# Patient Record
Sex: MALE | Race: WHITE | NOT HISPANIC OR LATINO | Employment: FULL TIME | URBAN - METROPOLITAN AREA
[De-identification: names, ages, dates, MRNs, and addresses within clinical notes are randomized per-mention and may not be internally consistent; named-entity substitution may affect disease eponyms.]

---

## 2023-02-01 PROBLEM — D84.9 IMMUNOSUPPRESSION (MULTI): Status: ACTIVE | Noted: 2023-02-01

## 2023-02-01 PROBLEM — K59.03 DRUG-INDUCED CONSTIPATION: Status: ACTIVE | Noted: 2023-02-01

## 2023-02-01 PROBLEM — R79.89 ELEVATED SERUM CREATININE: Status: ACTIVE | Noted: 2023-02-01

## 2023-02-01 PROBLEM — H66.91 RIGHT OTITIS MEDIA: Status: ACTIVE | Noted: 2023-02-01

## 2023-02-01 PROBLEM — R56.9 SEIZURE (MULTI): Status: ACTIVE | Noted: 2023-02-01

## 2023-02-01 PROBLEM — J01.90 ACUTE SINUSITIS: Status: ACTIVE | Noted: 2023-02-01

## 2023-02-01 PROBLEM — T50.995A ADVERSE EFFECT OF OTHER DRUGS, MEDICAMENTS AND BIOLOGICAL SUBSTANCES, INITIAL ENCOUNTER: Status: ACTIVE | Noted: 2023-02-01

## 2023-02-01 PROBLEM — J18.9 PNEUMONIA: Status: ACTIVE | Noted: 2023-02-01

## 2023-02-01 PROBLEM — R56.9 SINGLE SEIZURE (MULTI): Status: ACTIVE | Noted: 2023-02-01

## 2023-02-01 PROBLEM — Z94.0 KIDNEY REPLACED BY TRANSPLANT (HHS-HCC): Status: ACTIVE | Noted: 2023-02-01

## 2023-02-01 PROBLEM — R04.2 HEMOPTYSIS: Status: ACTIVE | Noted: 2023-02-01

## 2023-02-01 PROBLEM — H10.9 CONJUNCTIVITIS, LEFT EYE: Status: ACTIVE | Noted: 2023-02-01

## 2023-02-01 PROBLEM — B18.2: Status: ACTIVE | Noted: 2023-02-01

## 2023-02-01 PROBLEM — T86.11 KIDNEY TRANSPLANT REJECTION (HHS-HCC): Status: ACTIVE | Noted: 2023-02-01

## 2023-02-01 PROBLEM — J31.0 RHINITIS: Status: ACTIVE | Noted: 2023-02-01

## 2023-02-01 PROBLEM — R19.7 DIARRHEA: Status: ACTIVE | Noted: 2023-02-01

## 2023-02-01 PROBLEM — H66.92 LEFT OTITIS MEDIA: Status: ACTIVE | Noted: 2023-02-01

## 2023-02-01 PROBLEM — T86.11: Status: ACTIVE | Noted: 2023-02-01

## 2023-02-01 PROBLEM — U07.1 COVID-19 VIRUS INFECTION: Status: ACTIVE | Noted: 2023-02-01

## 2023-02-01 PROBLEM — J93.9 PNEUMOTHORAX: Status: ACTIVE | Noted: 2023-02-01

## 2023-02-01 PROBLEM — G89.18 POST-OP PAIN: Status: ACTIVE | Noted: 2023-02-01

## 2023-02-01 PROBLEM — I10 ESSENTIAL HYPERTENSION: Status: ACTIVE | Noted: 2023-02-01

## 2023-02-01 PROBLEM — I67.83 PRES (POSTERIOR REVERSIBLE ENCEPHALOPATHY SYNDROME): Status: ACTIVE | Noted: 2023-02-01

## 2023-03-14 RX ORDER — INDOMETHACIN 50 MG/1
50 CAPSULE ORAL
COMMUNITY
End: 2023-10-10 | Stop reason: ALTCHOICE

## 2023-03-14 RX ORDER — CYCLOSPORINE 100 MG/1
100 CAPSULE, LIQUID FILLED ORAL EVERY 12 HOURS
COMMUNITY
Start: 2020-01-31 | End: 2023-10-10 | Stop reason: SDUPTHER

## 2023-03-14 RX ORDER — CLONIDINE HYDROCHLORIDE 0.1 MG/1
1 TABLET ORAL DAILY
COMMUNITY
Start: 2020-02-03 | End: 2023-10-10 | Stop reason: SDUPTHER

## 2023-03-14 RX ORDER — TALC
3 POWDER (GRAM) TOPICAL NIGHTLY PRN
COMMUNITY
Start: 2020-01-31

## 2023-03-14 RX ORDER — PREDNISONE 5 MG/1
1 TABLET ORAL DAILY
COMMUNITY
Start: 2020-01-31 | End: 2023-10-10 | Stop reason: SDUPTHER

## 2023-03-14 RX ORDER — METOPROLOL TARTRATE 100 MG/1
100 TABLET ORAL EVERY 12 HOURS
COMMUNITY
Start: 2018-10-05 | End: 2023-10-10 | Stop reason: SDUPTHER

## 2023-03-14 RX ORDER — NIFEDIPINE 30 MG/1
1 TABLET, FILM COATED, EXTENDED RELEASE ORAL 2 TIMES DAILY
COMMUNITY
Start: 2018-09-11 | End: 2023-10-10 | Stop reason: SDUPTHER

## 2023-03-14 RX ORDER — CYCLOSPORINE 25 MG/1
25 CAPSULE, LIQUID FILLED ORAL 2 TIMES DAILY
COMMUNITY
Start: 2020-01-31 | End: 2023-10-10 | Stop reason: SDUPTHER

## 2023-03-14 RX ORDER — MYCOPHENOLATE MOFETIL 250 MG/1
750 CAPSULE ORAL EVERY 12 HOURS
COMMUNITY
Start: 2020-01-31 | End: 2023-10-10 | Stop reason: SDUPTHER

## 2023-03-15 ENCOUNTER — OFFICE VISIT (OUTPATIENT)
Dept: PRIMARY CARE | Facility: CLINIC | Age: 38
End: 2023-03-15
Payer: COMMERCIAL

## 2023-03-15 VITALS
BODY MASS INDEX: 26.18 KG/M2 | TEMPERATURE: 97 F | HEART RATE: 50 BPM | SYSTOLIC BLOOD PRESSURE: 111 MMHG | WEIGHT: 172.7 LBS | HEIGHT: 68 IN | DIASTOLIC BLOOD PRESSURE: 74 MMHG | OXYGEN SATURATION: 96 %

## 2023-03-15 DIAGNOSIS — N18.6 END STAGE RENAL DISEASE (MULTI): ICD-10-CM

## 2023-03-15 DIAGNOSIS — D84.9 IMMUNOSUPPRESSION (MULTI): ICD-10-CM

## 2023-03-15 DIAGNOSIS — B18.2: ICD-10-CM

## 2023-03-15 DIAGNOSIS — M10.00 ACUTE IDIOPATHIC GOUT, UNSPECIFIED SITE: Primary | ICD-10-CM

## 2023-03-15 DIAGNOSIS — R56.9: ICD-10-CM

## 2023-03-15 PROCEDURE — 1036F TOBACCO NON-USER: CPT | Performed by: FAMILY MEDICINE

## 2023-03-15 PROCEDURE — 3078F DIAST BP <80 MM HG: CPT | Performed by: FAMILY MEDICINE

## 2023-03-15 PROCEDURE — 3074F SYST BP LT 130 MM HG: CPT | Performed by: FAMILY MEDICINE

## 2023-03-15 PROCEDURE — 99213 OFFICE O/P EST LOW 20 MIN: CPT | Performed by: FAMILY MEDICINE

## 2023-03-15 ASSESSMENT — PAIN SCALES - GENERAL: PAINLEVEL: 0-NO PAIN

## 2023-03-15 NOTE — PROGRESS NOTES
"Subjective   Patient ID: Luigi Sheppard is a 38 y.o. male who presents for Follow-up and Gout.    Patient feels well. No further gouty attacks. Actually didn't use indomethacin I prescribed in January as he didn't need it. HE just started a new job at [employer]. A number of care gaps listed but these were almost certainly addressed since he had his kidney transplant in 2020. 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        Review of Systems    Objective   /74 (BP Location: Right arm, Patient Position: Sitting, BP Cuff Size: Adult)   Pulse 50   Temp 36.1 °C (97 °F) (Temporal)   Ht 1.727 m (5' 8\")   Wt 78.3 kg (172 lb 11.2 oz)   SpO2 96%   BMI 26.26 kg/m²     Physical Exam  Constitutional:       Appearance: Normal appearance. He is normal weight.   Cardiovascular:      Rate and Rhythm: Normal rate and regular rhythm.   Pulmonary:      Effort: Pulmonary effort is normal.      Breath sounds: No wheezing, rhonchi or rales.   Neurological:      Mental Status: He is alert.     Assessment/Plan   Diagnoses and all orders for this visit:  Acute idiopathic gout, unspecified site         "

## 2023-04-11 ENCOUNTER — APPOINTMENT (OUTPATIENT)
Dept: LAB | Facility: LAB | Age: 38
End: 2023-04-11
Payer: COMMERCIAL

## 2023-04-11 LAB
ALBUMIN (G/DL) IN SER/PLAS: 4.7 G/DL (ref 3.4–5)
ANION GAP IN SER/PLAS: 13 MMOL/L (ref 10–20)
CALCIUM (MG/DL) IN SER/PLAS: 9.9 MG/DL (ref 8.6–10.6)
CARBON DIOXIDE, TOTAL (MMOL/L) IN SER/PLAS: 25 MMOL/L (ref 21–32)
CHLORIDE (MMOL/L) IN SER/PLAS: 103 MMOL/L (ref 98–107)
CREATININE (MG/DL) IN SER/PLAS: 1.06 MG/DL (ref 0.5–1.3)
ERYTHROCYTE DISTRIBUTION WIDTH (RATIO) BY AUTOMATED COUNT: 14 % (ref 11.5–14.5)
ERYTHROCYTE MEAN CORPUSCULAR HEMOGLOBIN CONCENTRATION (G/DL) BY AUTOMATED: 32.7 G/DL (ref 32–36)
ERYTHROCYTE MEAN CORPUSCULAR VOLUME (FL) BY AUTOMATED COUNT: 86 FL (ref 80–100)
ERYTHROCYTES (10*6/UL) IN BLOOD BY AUTOMATED COUNT: 5.31 X10E12/L (ref 4.5–5.9)
GFR MALE: >90 ML/MIN/1.73M2
GLUCOSE (MG/DL) IN SER/PLAS: 96 MG/DL (ref 74–99)
HEMATOCRIT (%) IN BLOOD BY AUTOMATED COUNT: 45.5 % (ref 41–52)
HEMOGLOBIN (G/DL) IN BLOOD: 14.9 G/DL (ref 13.5–17.5)
LEUKOCYTES (10*3/UL) IN BLOOD BY AUTOMATED COUNT: 7.8 X10E9/L (ref 4.4–11.3)
NRBC (PER 100 WBCS) BY AUTOMATED COUNT: 0 /100 WBC (ref 0–0)
PHOSPHATE (MG/DL) IN SER/PLAS: 3.4 MG/DL (ref 2.5–4.9)
PLATELETS (10*3/UL) IN BLOOD AUTOMATED COUNT: 249 X10E9/L (ref 150–450)
POTASSIUM (MMOL/L) IN SER/PLAS: 4.3 MMOL/L (ref 3.5–5.3)
SODIUM (MMOL/L) IN SER/PLAS: 137 MMOL/L (ref 136–145)
UREA NITROGEN (MG/DL) IN SER/PLAS: 22 MG/DL (ref 6–23)

## 2023-04-12 LAB — CYCLOSPORINE (NG/L) IN BLOOD: 168 NG/ML (ref 80–210)

## 2023-09-21 LAB
ALBUMIN (G/DL) IN SER/PLAS: 4.6 G/DL (ref 3.4–5)
ANION GAP IN SER/PLAS: 9 MMOL/L (ref 10–20)
CALCIUM (MG/DL) IN SER/PLAS: 9.8 MG/DL (ref 8.6–10.3)
CARBON DIOXIDE, TOTAL (MMOL/L) IN SER/PLAS: 30 MMOL/L (ref 21–32)
CHLORIDE (MMOL/L) IN SER/PLAS: 103 MMOL/L (ref 98–107)
CREATININE (MG/DL) IN SER/PLAS: 1.08 MG/DL (ref 0.5–1.3)
ERYTHROCYTE DISTRIBUTION WIDTH (RATIO) BY AUTOMATED COUNT: 13.8 % (ref 11.5–14.5)
ERYTHROCYTE MEAN CORPUSCULAR HEMOGLOBIN CONCENTRATION (G/DL) BY AUTOMATED: 32.5 G/DL (ref 32–36)
ERYTHROCYTE MEAN CORPUSCULAR VOLUME (FL) BY AUTOMATED COUNT: 89 FL (ref 80–100)
ERYTHROCYTES (10*6/UL) IN BLOOD BY AUTOMATED COUNT: 5.24 X10E12/L (ref 4.5–5.9)
GFR MALE: 89 ML/MIN/1.73M2
GLUCOSE (MG/DL) IN SER/PLAS: 82 MG/DL (ref 74–99)
HEMATOCRIT (%) IN BLOOD BY AUTOMATED COUNT: 46.5 % (ref 41–52)
HEMOGLOBIN (G/DL) IN BLOOD: 15.1 G/DL (ref 13.5–17.5)
LEUKOCYTES (10*3/UL) IN BLOOD BY AUTOMATED COUNT: 5.7 X10E9/L (ref 4.4–11.3)
PHOSPHATE (MG/DL) IN SER/PLAS: 3 MG/DL (ref 2.5–4.9)
PLATELETS (10*3/UL) IN BLOOD AUTOMATED COUNT: 255 X10E9/L (ref 150–450)
POTASSIUM (MMOL/L) IN SER/PLAS: 4.3 MMOL/L (ref 3.5–5.3)
SODIUM (MMOL/L) IN SER/PLAS: 138 MMOL/L (ref 136–145)
URATE (MG/DL) IN SER/PLAS: 6.3 MG/DL (ref 4–7.5)
UREA NITROGEN (MG/DL) IN SER/PLAS: 14 MG/DL (ref 6–23)

## 2023-09-22 LAB — CYCLOSPORINE (NG/L) IN BLOOD: 92 NG/ML (ref 80–210)

## 2023-10-06 ENCOUNTER — SPECIALTY PHARMACY (OUTPATIENT)
Dept: PHARMACY | Facility: CLINIC | Age: 38
End: 2023-10-06

## 2023-10-06 ENCOUNTER — PHARMACY VISIT (OUTPATIENT)
Dept: PHARMACY | Facility: CLINIC | Age: 38
End: 2023-10-06
Payer: COMMERCIAL

## 2023-10-06 PROCEDURE — RXMED WILLOW AMBULATORY MEDICATION CHARGE

## 2023-10-06 NOTE — PROGRESS NOTES
Specialty Pharmacy Refill Coordination Note     Luigi Sheppard is a 38 y.o. male contacted today regarding refills of his specialty medication(s).    Patient address: [address] [place] [address]    Patient Medications: Cyclosporine 25mg/100mg, Clonidine 0.1mg, Metoprolol Tartrate 100mg, Mycophenolate 250mg, Nifedipine 30mg, Prednisone 5mg     Medication delivery date: Wed 10/11/2023         Laura Jacob  Specialty Pharmacy Technician

## 2023-10-10 RX ORDER — ALLOPURINOL 100 MG/1
50 TABLET ORAL DAILY
COMMUNITY
End: 2024-04-11 | Stop reason: SDUPTHER

## 2023-10-11 ENCOUNTER — APPOINTMENT (OUTPATIENT)
Dept: TRANSPLANT | Facility: HOSPITAL | Age: 38
End: 2023-10-11
Payer: COMMERCIAL

## 2023-10-11 ENCOUNTER — OFFICE VISIT (OUTPATIENT)
Dept: TRANSPLANT | Facility: HOSPITAL | Age: 38
End: 2023-10-11
Payer: COMMERCIAL

## 2023-10-11 VITALS
DIASTOLIC BLOOD PRESSURE: 73 MMHG | TEMPERATURE: 98.1 F | SYSTOLIC BLOOD PRESSURE: 137 MMHG | BODY MASS INDEX: 26.27 KG/M2 | HEART RATE: 52 BPM | HEIGHT: 68 IN | WEIGHT: 173.3 LBS | RESPIRATION RATE: 16 BRPM | OXYGEN SATURATION: 98 %

## 2023-10-11 DIAGNOSIS — I10 HYPERTENSION, UNSPECIFIED TYPE: ICD-10-CM

## 2023-10-11 DIAGNOSIS — Z94.0 KIDNEY REPLACED BY TRANSPLANT (HHS-HCC): ICD-10-CM

## 2023-10-11 DIAGNOSIS — Z92.25 PERSONAL HISTORY OF IMMUNOSUPRESSION THERAPY: Primary | ICD-10-CM

## 2023-10-11 PROCEDURE — 3075F SYST BP GE 130 - 139MM HG: CPT | Performed by: HOSPITALIST

## 2023-10-11 PROCEDURE — 99214 OFFICE O/P EST MOD 30 MIN: CPT

## 2023-10-11 PROCEDURE — 3078F DIAST BP <80 MM HG: CPT | Performed by: HOSPITALIST

## 2023-10-11 PROCEDURE — 1036F TOBACCO NON-USER: CPT | Performed by: HOSPITALIST

## 2023-10-11 RX ORDER — CYCLOSPORINE 100 MG/1
100 CAPSULE, LIQUID FILLED ORAL EVERY 12 HOURS
Qty: 180 CAPSULE | Refills: 0 | Status: SHIPPED | OUTPATIENT
Start: 2023-10-11 | End: 2024-04-11 | Stop reason: SDUPTHER

## 2023-10-11 RX ORDER — CYCLOSPORINE 25 MG/1
CAPSULE, LIQUID FILLED ORAL
Qty: 180 CAPSULE | Refills: 3 | Status: SHIPPED | OUTPATIENT
Start: 2023-10-11 | End: 2024-04-11 | Stop reason: SDUPTHER

## 2023-10-11 RX ORDER — METOPROLOL TARTRATE 100 MG/1
TABLET ORAL
Qty: 180 TABLET | Refills: 3 | Status: SHIPPED | OUTPATIENT
Start: 2023-10-11 | End: 2024-04-11 | Stop reason: SDUPTHER

## 2023-10-11 RX ORDER — NIFEDIPINE 30 MG/1
30 TABLET, FILM COATED, EXTENDED RELEASE ORAL 2 TIMES DAILY
Qty: 180 TABLET | Refills: 3 | Status: SHIPPED | OUTPATIENT
Start: 2023-10-11 | End: 2024-04-11 | Stop reason: SDUPTHER

## 2023-10-11 RX ORDER — PREDNISONE 5 MG/1
5 TABLET ORAL DAILY
Qty: 90 TABLET | Refills: 3 | Status: SHIPPED | OUTPATIENT
Start: 2023-10-11 | End: 2024-04-11 | Stop reason: SDUPTHER

## 2023-10-11 ASSESSMENT — ENCOUNTER SYMPTOMS
PALPITATIONS: 0
VOMITING: 0
ABDOMINAL DISTENTION: 0
HEADACHES: 0
COUGH: 0
HEMATURIA: 0
FREQUENCY: 0
SHORTNESS OF BREATH: 0
DIARRHEA: 0
CHEST TIGHTNESS: 0
CONFUSION: 0
BACK PAIN: 0
NAUSEA: 0
NERVOUS/ANXIOUS: 0

## 2023-10-11 NOTE — PROGRESS NOTES
"Subjective:Luigi Sheppard 38 y.o.  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 His previous transplant course was complicated by biopsy proven antibody-mediated rejection and borderline acute cellular rejection. He received another  donor kidney transplant on 2020. The donor was increased risk.Hep C seroconversion needing treatment.. Was admitted for an isolated seizure due to PRES in 2019.Switched from tacrolimus to Cyclosporine due to risk association between Tacrolimus and PRES.     Review of Systems   Respiratory:  Negative for cough, chest tightness and shortness of breath.    Cardiovascular:  Negative for chest pain, palpitations and leg swelling.   Gastrointestinal:  Negative for abdominal distention, diarrhea, nausea and vomiting.   Genitourinary:  Negative for frequency, hematuria and urgency.   Musculoskeletal:  Negative for back pain.   Neurological:  Negative for headaches.   Psychiatric/Behavioral:  Negative for confusion. The patient is not nervous/anxious.         Objective:  Visit Vitals  /73 (BP Location: Right arm, Patient Position: Sitting, BP Cuff Size: Adult)   Pulse 52   Temp 36.7 °C (98.1 °F)   Resp 16   Ht 1.727 m (5' 8\")   Wt 78.6 kg (173 lb 4.8 oz)   SpO2 98%   BMI 26.35 kg/m²   Smoking Status Never   BSA 1.94 m²      Physical Exam  HENT:      Head: Normocephalic and atraumatic.      Nose: Nose normal.      Mouth/Throat:      Mouth: Mucous membranes are moist.   Eyes:      Extraocular Movements: Extraocular movements intact.      Pupils: Pupils are equal, round, and reactive to light.   Cardiovascular:      Rate and Rhythm: Normal rate.      Pulses: Normal pulses.      Heart sounds: Normal heart sounds.   Pulmonary:      Effort: Pulmonary effort is normal.   Abdominal:      Palpations: Abdomen is soft.      Tenderness: There is no abdominal tenderness. There is no guarding. "   Musculoskeletal:         General: Normal range of motion.      Cervical back: Normal range of motion.   Skin:     General: Skin is warm.   Neurological:      General: No focal deficit present.      Mental Status: Mental status is at baseline.   Psychiatric:         Mood and Affect: Mood normal.            Current Outpatient Medications:     allopurinol (Zyloprim) 100 mg tablet, Take 0.5 tablets (50 mg) by mouth once daily., Disp: , Rfl:     cloNIDine (Catapres) 0.1 mg tablet, TAKE ONE (1) TABLET BY MOUTH DAILY., Disp: 90 tablet, Rfl: 3    cycloSPORINE modified (NEOral) 100 mg capsule, Take 1 capsule (100 mg) by mouth every 12 hours., Disp: 180 capsule, Rfl: 0    cycloSPORINE modified (NEOral) 25 mg capsule, TAKE ONE (1) CAPSULE BY MOUTH TWICE DAILY ALONG WITH ONE (1) 100 MG CAPSULE FOR A TOTAL DOSE  MG TWICE DAILY., Disp: 180 capsule, Rfl: 3    melatonin 3 mg tablet, Take 1 tablet (3 mg) by mouth as needed at bedtime for sleep., Disp: , Rfl:     metoprolol tartrate (Lopressor) 100 mg tablet, TAKE ONE (1) TABLET BY MOUTH EVERY 12 HOURS. HOLD DOSE IF SYSTOLIC BLOOD PRESSURE<110 OR HEART RATE<60, Disp: 180 tablet, Rfl: 3    mycophenolate (Cellcept) 250 mg capsule, TAKE THREE (3) CAPSULES BY MOUTH EVERY 12 HOURS., Disp: 540 capsule, Rfl: 3    NIFEdipine ER (Adalat CC) 30 mg 24 hr tablet, TAKE ONE (1) TABLET BY MOUTH TWICE DAILY., Disp: 180 tablet, Rfl: 3    predniSONE (Deltasone) 5 mg tablet, TAKE ONE (1) TABLET BY MOUTH DAILY., Disp: 90 tablet, Rfl: 3      [unfilled]     No images are attached to the encounter.     Assessment and Plan :Luigi Sheppard 38 y.o.  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 His previous transplant course was complicated by biopsy proven antibody-mediated rejection and borderline acute cellular rejection. He received another  donor kidney transplant on 2020. The donor was  increased risk.Hep C seroconversion needing treatment.. Was admitted for an isolated seizure due to PRES in 12/2019.Switched from tacrolimus to Cyclosporine due to risk association between Tacrolimus and PRES.     Interim history: Denied any complaints on today's visit.  Last attack of gout is almost 6 months back since we started allopurinol.    Allograft function: Stable creatinine in the range of 0.9-1.1 currently around 1.08, stable electrolytes last UPC 0.06 we will repeat UPC with the next labs.    Immunosuppression: Continue with the current dose of prednisone, mycophenolate 3 capsules twice daily and cyclosporine 125 twice daily.  Recent cyclosporine levels are 92 Aim levels are 75-1 50.    Hemodynamics: Blood pressures are optimally controlled continue with the clonidine and metoprolol and nifedipine.    No anemia or leukopenia noticed on recent labs.    Bone mineral disease: Recommended DEXA scan, calcium and phosphorus levels are optimal we will check vitamin D and PTH with the next labs.    General health care: Recommended annual dermatology visits and age-appropriate cancer screening.  Also recommended COVID and flu shots.    Labs every 3 months and follow-up in 6 months.      Av Macias MD

## 2023-11-25 DIAGNOSIS — D84.9 COVID-19 IN IMMUNOCOMPROMISED PATIENT (MULTI): ICD-10-CM

## 2023-11-25 DIAGNOSIS — U07.1 COVID-19 IN IMMUNOCOMPROMISED PATIENT (MULTI): ICD-10-CM

## 2023-11-25 NOTE — TELEPHONE ENCOUNTER
"ON-CALL:  Patient paged on-call to report he had tested positive for COVID. He reports mild symptoms that \"feel like a cold\" starting Thursday. Patient instructed to decrease MMF to 500mg bid x1 week and provided ED precautions. Rx for molnupiravir sent to Dr. Hebert for signature to go to [facility] in [place], [place] where patient is visiting. Luigi expressed understanding of all.   "

## 2023-11-29 DIAGNOSIS — Z94.0 KIDNEY REPLACED BY TRANSPLANT (HHS-HCC): Primary | ICD-10-CM

## 2023-11-29 RX ORDER — MYCOPHENOLATE MOFETIL 250 MG/1
750 CAPSULE ORAL EVERY 12 HOURS
Qty: 540 CAPSULE | Refills: 3 | Status: CANCELLED | OUTPATIENT
Start: 2023-11-29 | End: 2024-11-27

## 2023-11-29 RX ORDER — CLONIDINE HYDROCHLORIDE 0.1 MG/1
0.1 TABLET ORAL DAILY
Qty: 90 TABLET | Refills: 3 | Status: SHIPPED | OUTPATIENT
Start: 2023-11-29 | End: 2024-04-11 | Stop reason: SDUPTHER

## 2023-12-01 ENCOUNTER — SPECIALTY PHARMACY (OUTPATIENT)
Dept: PHARMACY | Facility: CLINIC | Age: 38
End: 2023-12-01

## 2023-12-20 ENCOUNTER — OFFICE VISIT (OUTPATIENT)
Dept: PRIMARY CARE | Facility: CLINIC | Age: 38
End: 2023-12-20
Payer: COMMERCIAL

## 2023-12-20 VITALS
TEMPERATURE: 97.8 F | OXYGEN SATURATION: 99 % | WEIGHT: 173.2 LBS | DIASTOLIC BLOOD PRESSURE: 86 MMHG | SYSTOLIC BLOOD PRESSURE: 138 MMHG | HEIGHT: 68 IN | BODY MASS INDEX: 26.25 KG/M2 | HEART RATE: 50 BPM

## 2023-12-20 DIAGNOSIS — L30.9 ECZEMA OF FACE: Primary | ICD-10-CM

## 2023-12-20 DIAGNOSIS — Z00.00 WELL ADULT EXAM: ICD-10-CM

## 2023-12-20 PROCEDURE — 3075F SYST BP GE 130 - 139MM HG: CPT | Performed by: FAMILY MEDICINE

## 2023-12-20 PROCEDURE — 99396 PREV VISIT EST AGE 40-64: CPT | Performed by: FAMILY MEDICINE

## 2023-12-20 PROCEDURE — 1036F TOBACCO NON-USER: CPT | Performed by: FAMILY MEDICINE

## 2023-12-20 PROCEDURE — 3079F DIAST BP 80-89 MM HG: CPT | Performed by: FAMILY MEDICINE

## 2023-12-20 RX ORDER — MOMETASONE FUROATE 1 MG/G
OINTMENT TOPICAL DAILY
Qty: 45 G | Refills: 0 | Status: SHIPPED | OUTPATIENT
Start: 2023-12-20 | End: 2024-01-19

## 2023-12-20 ASSESSMENT — ENCOUNTER SYMPTOMS
OCCASIONAL FEELINGS OF UNSTEADINESS: 0
DEPRESSION: 0
LOSS OF SENSATION IN FEET: 0

## 2023-12-20 ASSESSMENT — PAIN SCALES - GENERAL: PAINLEVEL: 0-NO PAIN

## 2023-12-20 NOTE — PROGRESS NOTES
Luigi has been doing quite well since I last saw him roughly 5 months ago. He has a history of Alport syndrome with a kidney transplant. He works as a  for [employer]. He lives with his wife.    He is a non-smoker. His alcohol consumption is minimal.     He is up to date on his vaccines through follow-up visits for his transplant. His only concern today is an itchy rash on his forehead and cheeks with no rash elsewhere. He states his skin is dry there as well. It appeared gradually overt the past few months. He otherwise feels quite well. No gouty flares since I've seen him. He takes allopurinol regularly.     ON exam, he looks well. His pulmonary and cardiovascular exams are normal.     He has a macular, slightly erythematous patch on his forehead, and some erythema of both upper cheeks. No scaling.     Imp/Plan:  He is here for a general checkup and doing very well. I believe he has mild eczema, for which I prescribed Elocon, until it disappears. F/U 6 months or sooner if rash persists.

## 2023-12-28 ENCOUNTER — SPECIALTY PHARMACY (OUTPATIENT)
Dept: PHARMACY | Facility: CLINIC | Age: 38
End: 2023-12-28

## 2023-12-28 DIAGNOSIS — Z94.0 KIDNEY REPLACED BY TRANSPLANT (HHS-HCC): Primary | ICD-10-CM

## 2023-12-29 RX ORDER — MYCOPHENOLATE MOFETIL 250 MG/1
750 CAPSULE ORAL EVERY 12 HOURS
Qty: 540 CAPSULE | Refills: 3 | Status: SHIPPED | OUTPATIENT
Start: 2023-12-29 | End: 2024-04-11 | Stop reason: SDUPTHER

## 2024-01-05 PROCEDURE — RXMED WILLOW AMBULATORY MEDICATION CHARGE

## 2024-01-10 ENCOUNTER — PHARMACY VISIT (OUTPATIENT)
Dept: PHARMACY | Facility: CLINIC | Age: 39
End: 2024-01-10
Payer: COMMERCIAL

## 2024-01-25 ENCOUNTER — SPECIALTY PHARMACY (OUTPATIENT)
Dept: PHARMACY | Facility: CLINIC | Age: 39
End: 2024-01-25

## 2024-01-25 PROCEDURE — RXMED WILLOW AMBULATORY MEDICATION CHARGE

## 2024-01-26 ENCOUNTER — PHARMACY VISIT (OUTPATIENT)
Dept: PHARMACY | Facility: CLINIC | Age: 39
End: 2024-01-26
Payer: MEDICARE

## 2024-04-03 ENCOUNTER — LAB (OUTPATIENT)
Dept: LAB | Facility: LAB | Age: 39
End: 2024-04-03
Payer: COMMERCIAL

## 2024-04-03 DIAGNOSIS — Z94.0 KIDNEY REPLACED BY TRANSPLANT (HHS-HCC): ICD-10-CM

## 2024-04-03 LAB
ALBUMIN SERPL BCP-MCNC: 4.5 G/DL (ref 3.4–5)
ANION GAP SERPL CALC-SCNC: 11 MMOL/L (ref 10–20)
BUN SERPL-MCNC: 13 MG/DL (ref 6–23)
CALCIUM SERPL-MCNC: 10 MG/DL (ref 8.6–10.3)
CHLORIDE SERPL-SCNC: 105 MMOL/L (ref 98–107)
CO2 SERPL-SCNC: 27 MMOL/L (ref 21–32)
CREAT SERPL-MCNC: 1.03 MG/DL (ref 0.5–1.3)
CREAT UR-MCNC: 162.8 MG/DL (ref 20–370)
EGFRCR SERPLBLD CKD-EPI 2021: >90 ML/MIN/1.73M*2
ERYTHROCYTE [DISTWIDTH] IN BLOOD BY AUTOMATED COUNT: 13.6 % (ref 11.5–14.5)
GLUCOSE SERPL-MCNC: 74 MG/DL (ref 74–99)
HCT VFR BLD AUTO: 45.6 % (ref 41–52)
HGB BLD-MCNC: 14.6 G/DL (ref 13.5–17.5)
MAGNESIUM SERPL-MCNC: 1.75 MG/DL (ref 1.6–2.4)
MCH RBC QN AUTO: 28.6 PG (ref 26–34)
MCHC RBC AUTO-ENTMCNC: 32 G/DL (ref 32–36)
MCV RBC AUTO: 89 FL (ref 80–100)
NRBC BLD-RTO: 0 /100 WBCS (ref 0–0)
PHOSPHATE SERPL-MCNC: 3.1 MG/DL (ref 2.5–4.9)
PLATELET # BLD AUTO: 305 X10*3/UL (ref 150–450)
POTASSIUM SERPL-SCNC: 4.4 MMOL/L (ref 3.5–5.3)
PROT UR-ACNC: 10 MG/DL (ref 5–25)
PROT/CREAT UR: 0.06 MG/MG CREAT (ref 0–0.17)
RBC # BLD AUTO: 5.11 X10*6/UL (ref 4.5–5.9)
SODIUM SERPL-SCNC: 139 MMOL/L (ref 136–145)
WBC # BLD AUTO: 4.8 X10*3/UL (ref 4.4–11.3)

## 2024-04-03 PROCEDURE — 80158 DRUG ASSAY CYCLOSPORINE: CPT

## 2024-04-03 PROCEDURE — 36415 COLL VENOUS BLD VENIPUNCTURE: CPT

## 2024-04-03 PROCEDURE — 83970 ASSAY OF PARATHORMONE: CPT

## 2024-04-03 PROCEDURE — 82652 VIT D 1 25-DIHYDROXY: CPT

## 2024-04-03 PROCEDURE — 82570 ASSAY OF URINE CREATININE: CPT

## 2024-04-03 PROCEDURE — 85027 COMPLETE CBC AUTOMATED: CPT

## 2024-04-03 PROCEDURE — 83735 ASSAY OF MAGNESIUM: CPT

## 2024-04-03 PROCEDURE — 84156 ASSAY OF PROTEIN URINE: CPT

## 2024-04-03 PROCEDURE — 80069 RENAL FUNCTION PANEL: CPT

## 2024-04-04 ENCOUNTER — SPECIALTY PHARMACY (OUTPATIENT)
Dept: PHARMACY | Facility: CLINIC | Age: 39
End: 2024-04-04

## 2024-04-04 LAB
CYCLOSPORINE BLD IA-MCNC: 101 NG/ML (ref 80–210)
PTH-INTACT SERPL-MCNC: 145.4 PG/ML (ref 18.5–88)

## 2024-04-05 LAB — 1,25(OH)2D3 SERPL-MCNC: 45.1 PG/ML (ref 19.9–79.3)

## 2024-04-11 ENCOUNTER — OFFICE VISIT (OUTPATIENT)
Dept: TRANSPLANT | Facility: HOSPITAL | Age: 39
End: 2024-04-11
Payer: COMMERCIAL

## 2024-04-11 VITALS
SYSTOLIC BLOOD PRESSURE: 124 MMHG | TEMPERATURE: 97.1 F | OXYGEN SATURATION: 97 % | DIASTOLIC BLOOD PRESSURE: 72 MMHG | BODY MASS INDEX: 27.25 KG/M2 | HEART RATE: 56 BPM | WEIGHT: 179.2 LBS

## 2024-04-11 DIAGNOSIS — E55.9 VITAMIN D DEFICIENCY: ICD-10-CM

## 2024-04-11 DIAGNOSIS — I10 ESSENTIAL HYPERTENSION: ICD-10-CM

## 2024-04-11 DIAGNOSIS — E21.3 HYPERPARATHYROIDISM (MULTI): ICD-10-CM

## 2024-04-11 DIAGNOSIS — Z94.0 KIDNEY REPLACED BY TRANSPLANT (HHS-HCC): Primary | ICD-10-CM

## 2024-04-11 DIAGNOSIS — R06.09 DOE (DYSPNEA ON EXERTION): ICD-10-CM

## 2024-04-11 DIAGNOSIS — Z94.0 IMMUNOSUPPRESSIVE MANAGEMENT ENCOUNTER FOLLOWING KIDNEY TRANSPLANT (HHS-HCC): ICD-10-CM

## 2024-04-11 DIAGNOSIS — Z79.899 IMMUNOSUPPRESSIVE MANAGEMENT ENCOUNTER FOLLOWING KIDNEY TRANSPLANT (HHS-HCC): ICD-10-CM

## 2024-04-11 PROCEDURE — 99214 OFFICE O/P EST MOD 30 MIN: CPT

## 2024-04-11 PROCEDURE — 3074F SYST BP LT 130 MM HG: CPT

## 2024-04-11 PROCEDURE — 3078F DIAST BP <80 MM HG: CPT

## 2024-04-11 PROCEDURE — RXMED WILLOW AMBULATORY MEDICATION CHARGE

## 2024-04-11 RX ORDER — NIFEDIPINE 30 MG/1
30 TABLET, FILM COATED, EXTENDED RELEASE ORAL 2 TIMES DAILY
Qty: 180 TABLET | Refills: 3 | Status: SHIPPED | OUTPATIENT
Start: 2024-04-11 | End: 2025-04-10

## 2024-04-11 RX ORDER — METOPROLOL TARTRATE 100 MG/1
TABLET ORAL
Qty: 180 TABLET | Refills: 3 | Status: SHIPPED | OUTPATIENT
Start: 2024-04-11 | End: 2025-04-10

## 2024-04-11 RX ORDER — CYCLOSPORINE 100 MG/1
100 CAPSULE, LIQUID FILLED ORAL EVERY 12 HOURS
Qty: 60 CAPSULE | Refills: 11 | Status: SHIPPED | OUTPATIENT
Start: 2024-04-11 | End: 2025-04-11

## 2024-04-11 RX ORDER — ACETAMINOPHEN 500 MG
2000 TABLET ORAL DAILY
Qty: 90 CAPSULE | Refills: 3 | Status: SHIPPED | OUTPATIENT
Start: 2024-04-11 | End: 2025-04-11

## 2024-04-11 RX ORDER — ACETAMINOPHEN 500 MG
TABLET ORAL DAILY
COMMUNITY
End: 2024-04-11 | Stop reason: SDUPTHER

## 2024-04-11 RX ORDER — CLONIDINE HYDROCHLORIDE 0.1 MG/1
0.1 TABLET ORAL DAILY
Qty: 90 TABLET | Refills: 3 | Status: SHIPPED | OUTPATIENT
Start: 2024-04-11 | End: 2025-04-10

## 2024-04-11 RX ORDER — CYCLOSPORINE 25 MG/1
25 CAPSULE, LIQUID FILLED ORAL 2 TIMES DAILY
Qty: 60 CAPSULE | Refills: 11 | Status: SHIPPED | OUTPATIENT
Start: 2024-04-11 | End: 2025-04-10

## 2024-04-11 RX ORDER — PREDNISONE 5 MG/1
5 TABLET ORAL DAILY
Qty: 90 TABLET | Refills: 3 | Status: SHIPPED | OUTPATIENT
Start: 2024-04-11 | End: 2025-04-10

## 2024-04-11 RX ORDER — ALLOPURINOL 100 MG/1
50 TABLET ORAL DAILY
Qty: 45 TABLET | Refills: 3 | Status: SHIPPED | OUTPATIENT
Start: 2024-04-11 | End: 2025-04-11

## 2024-04-11 RX ORDER — MYCOPHENOLATE MOFETIL 250 MG/1
750 CAPSULE ORAL EVERY 12 HOURS
Qty: 540 CAPSULE | Refills: 3 | Status: SHIPPED | OUTPATIENT
Start: 2024-04-11 | End: 2025-04-10

## 2024-04-11 ASSESSMENT — PAIN SCALES - GENERAL: PAINLEVEL: 0-NO PAIN

## 2024-04-11 NOTE — PATIENT INSTRUCTIONS
Labs every 3 months  Started  Vitamin-D 2000 mcg OTC   Return to clinic in 6 months  PTH/Vit'D/lipid panel/A1C with next labs

## 2024-04-11 NOTE — PROGRESS NOTES
TRANSPLANT NEPHROLOGY :   OUTPATIENT CLINIC NOTE      SERVICE DATE : 2024    REASON FOR VISIT/CHIEF COMPLAINT:  S/P  TRANSPLANT SURGERY  IMMUNOSUPPRESSIVE MEDICATION MANAGEMENT  BLOOD PRESSURE MANAGEMENT    HPI:    Mr. Sheppard is a 39 y.o. 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 Then he underwent 2nd DDKT on on 2020 [donor was increased risk. PRA of 37%  CMV D+/R- ; Hep C s/p treatment ,  hx of  seizure due to tacrolimus associated PRES in 2019 and tacrolimus was switched to Cyclosporine ].    Patient is here for follow up s/p kidney transplant.    Patient is doing well overall. No new complaints, only weight gain and occasional HARRIS.    Denied chest pain. Normal urination and bowel movement. Normal gait and no weakness of arms/legs. No cough, runny nose, sore throat, cold symptoms, or rash. No hearing loss. Normal vision.No problems with his sleep, mood and function. No recent infection, hospitalization, surgery or ER visits.      ROS:  Review of  14 systems was performed system by system. See HPI. Otherwise, the symptoms were negative.    PAST MEDICAL HISTORY:  Past Medical History:   Diagnosis Date    Alport syndrome 2016    Alport syndrome    Other long term (current) drug therapy 2020    High risk medication use        PAST SURGICAL HISTORY:  Past Surgical History:   Procedure Laterality Date    OTHER SURGICAL HISTORY  02/10/2020    Kidney transplantation        SOCIAL HISTORY:  Social History     Socioeconomic History    Marital status:      Spouse name: Not on file    Number of children: Not on file    Years of education: Not on file    Highest education level: Not on file   Occupational History    Not on file   Tobacco Use    Smoking status: Never    Smokeless tobacco: Never   Substance and  Sexual Activity    Alcohol use: Yes    Drug use: Yes     Types: Marijuana    Sexual activity: Not on file   Other Topics Concern    Not on file   Social History Narrative    Not on file     Social Determinants of Health     Financial Resource Strain: Not on file   Food Insecurity: Not on file   Transportation Needs: Not on file   Physical Activity: Not on file   Stress: Not on file   Social Connections: Not on file   Intimate Partner Violence: Not on file   Housing Stability: Not on file       FAMILY HISTORY:  Family History   Problem Relation Name Age of Onset    No Known Problems Mother      No Known Problems Father         MEDICATION LIST:  Current Outpatient Medications   Medication Instructions    allopurinol (ZYLOPRIM) 50 mg, oral, Daily    cloNIDine (Catapres) 0.1 mg tablet TAKE ONE (1) TABLET BY MOUTH DAILY.    cycloSPORINE modified (NEOral) 25 mg capsule TAKE ONE (1) CAPSULE BY MOUTH TWICE DAILY ALONG WITH ONE (1) 100 MG CAPSULE FOR A TOTAL DOSE  MG TWICE DAILY.    cycloSPORINE modified (NEORAL) 100 mg, oral, Every 12 hours    melatonin 3 mg, oral, Nightly PRN    metoprolol tartrate (Lopressor) 100 mg tablet TAKE ONE (1) TABLET BY MOUTH EVERY 12 HOURS. HOLD DOSE IF SYSTOLIC BLOOD PRESSURE<110 OR HEART RATE<60    mometasone (Elocon) 0.1 % ointment Topical, Daily, Apply a thin layer to affected areas on face at night. Wash hands thoroughly after applying.    mycophenolate (Cellcept) 250 mg capsule TAKE THREE (3) CAPSULES BY MOUTH EVERY 12 HOURS.    NIFEdipine ER (Adalat CC) 30 mg 24 hr tablet TAKE ONE (1) TABLET BY MOUTH TWICE DAILY.    predniSONE (Deltasone) 5 mg tablet TAKE ONE (1) TABLET BY MOUTH DAILY.       ALLERGY  Allergies   Allergen Reactions    Lymphocyte Immune Globulin,Antithymocyte (Equine) Unknown       PHYSICAL EXAM:    Visit Vitals  /72   Pulse 56   Temp 36.2 °C (97.1 °F) (Temporal)   Wt 81.3 kg (179 lb 3.2 oz)   SpO2 97%   BMI 27.25 kg/m²   Smoking Status Never   BSA 1.97 m²           Vital signs - reviewed. Acceptable BP at this office visit.   General Appearance - NAD, Good speech, oriented and alert  HEENT - Supple. Not pale. No jaundice. No cervical lymphadenopathy. Pharynx and tonsils are not injected.  CVS - RRR. Normal S1/S2. No murmur, click , rub or gallop  Lungs- clear to auscultation bilaterally  Abdomen - soft , not tender, no guarding, no rigidity. No hepatosplenomegaly. Normal bowel sounds. No masses and ascites. S/P Kidney transplant .  Transplanted kidney is not tender.   Musculoskeletal /Extremities - no edema. Full ROM. No joint tenderness.   Neuro/Psych - appropriate mood and affect. Motor power V/V all extremities. CN I -XII were grossly intact.  Skin - No visible rash      LABS:    Lab Results   Component Value Date    WBC 4.8 04/03/2024    HGB 14.6 04/03/2024    HCT 45.6 04/03/2024     04/03/2024    CHOL 228 (H) 02/13/2019    TRIG 545 (H) 02/13/2019    HDL 31.3 (A) 02/13/2019    ALT 20 07/28/2020    AST 18 07/28/2020     04/03/2024    K 4.4 04/03/2024     04/03/2024    CREATININE 1.03 04/03/2024    BUN 13 04/03/2024    CO2 27 04/03/2024    INR 1.0 03/11/2020    HGBA1C 5.3 02/22/2019     par    ASSESSMENT AND PLAN:    Mr. Sheppard is a 39 y.o. male  who is here for follow up s/p kidney transplant.    TRANSPLANT DATE: 1/30/2020 (Kidney), 9/23/2012 (Kidney), 10/31/2012 (Kidney)      1. ESRD S/P kidney transplant   - Creatinine last check was :  Lab Results   Component Value Date    CREATININE 1.03 04/03/2024     Creatinine clearance cannot be calculated (Patient's most recent lab result is older than the maximum 7 days allowed.)    - Renal allograft function is good.   -Random urine protein/creatinine ratio is normal  -Ensure adequate hydration  - Avoid nephrotoxic medications, NSAIDs, and IV contrast.    2. Immunosuppression  -CSA LEVEL was at goal. Patient confirmed true trough level.  -Continue current immunosuppression regimen.    3.  Electrolytes  Lab Results   Component Value Date    GLUCOSE 74 04/03/2024    CALCIUM 10.0 04/03/2024     04/03/2024    K 4.4 04/03/2024    CO2 27 04/03/2024     04/03/2024    BUN 13 04/03/2024    CREATININE 1.03 04/03/2024     -Acceptable from last lab drawn    4. Hypertension  Blood Pressures         4/11/2024  0802             BP: 124/72          -Home  BP had been acceptable  -Encourage to monitor home BP  -Continue current anti hypertensive medication    5. Bone Mineral Disease/Osteoporosis  Lab Results   Component Value Date    .4 (H) 04/03/2024    CALCIUM 10.0 04/03/2024    CAION 1.31 02/01/2020    PHOS 3.1 04/03/2024     -check VIT D, PTH with next lab  - Consider DEXA every 2-3 years , defer to PCP  -start VIT D2 @2000 daily    6.Anemia  Lab Results   Component Value Date    WBC 4.8 04/03/2024    HGB 14.6 04/03/2024    HCT 45.6 04/03/2024    MCV 89 04/03/2024     04/03/2024     -asymptomatic  - Continue to monitor  -check iron studies and ferritin. Will consider CRUZ as needed.  - No indications for blood transfusion     7.Health maintenance and vaccination  - Flu shot during flu season annually  - Cancer screening is up to date per the patient    Lab : Routine transplant lab ( CBC, RFP, and anti-rejection trough level ), UPC ratio  every 3 months  Additional labs:  VIT D, PTH, lipid, A1C, uric acid  with next lab      Additional Plan :  Echocardiogram for occasional HARRIS  Add OTC VITD3@2000 international units daily.  RTC 6 month(s)    Zack Hebert    Transplant Nephrology

## 2024-04-15 ENCOUNTER — PHARMACY VISIT (OUTPATIENT)
Dept: PHARMACY | Facility: CLINIC | Age: 39
End: 2024-04-15
Payer: MEDICARE

## 2024-04-15 PROCEDURE — RXMED WILLOW AMBULATORY MEDICATION CHARGE

## 2024-04-24 ENCOUNTER — SPECIALTY PHARMACY (OUTPATIENT)
Dept: PHARMACY | Facility: CLINIC | Age: 39
End: 2024-04-24

## 2024-04-24 PROCEDURE — RXMED WILLOW AMBULATORY MEDICATION CHARGE

## 2024-04-25 ENCOUNTER — PHARMACY VISIT (OUTPATIENT)
Dept: PHARMACY | Facility: CLINIC | Age: 39
End: 2024-04-25
Payer: MEDICARE

## 2024-05-08 PROCEDURE — RXMED WILLOW AMBULATORY MEDICATION CHARGE

## 2024-05-09 ENCOUNTER — PHARMACY VISIT (OUTPATIENT)
Dept: PHARMACY | Facility: CLINIC | Age: 39
End: 2024-05-09
Payer: MEDICARE

## 2024-05-20 ENCOUNTER — SPECIALTY PHARMACY (OUTPATIENT)
Dept: PHARMACY | Facility: CLINIC | Age: 39
End: 2024-05-20

## 2024-05-22 PROCEDURE — RXMED WILLOW AMBULATORY MEDICATION CHARGE

## 2024-05-23 ENCOUNTER — PHARMACY VISIT (OUTPATIENT)
Dept: PHARMACY | Facility: CLINIC | Age: 39
End: 2024-05-23
Payer: MEDICARE

## 2024-06-11 ENCOUNTER — SPECIALTY PHARMACY (OUTPATIENT)
Dept: PHARMACY | Facility: CLINIC | Age: 39
End: 2024-06-11

## 2024-06-11 PROCEDURE — RXMED WILLOW AMBULATORY MEDICATION CHARGE

## 2024-06-17 ENCOUNTER — PHARMACY VISIT (OUTPATIENT)
Dept: PHARMACY | Facility: CLINIC | Age: 39
End: 2024-06-17
Payer: MEDICARE

## 2024-06-28 ENCOUNTER — SPECIALTY PHARMACY (OUTPATIENT)
Dept: PHARMACY | Facility: CLINIC | Age: 39
End: 2024-06-28

## 2024-06-28 PROCEDURE — RXMED WILLOW AMBULATORY MEDICATION CHARGE

## 2024-07-09 ENCOUNTER — SPECIALTY PHARMACY (OUTPATIENT)
Dept: PHARMACY | Facility: CLINIC | Age: 39
End: 2024-07-09

## 2024-07-09 PROCEDURE — RXMED WILLOW AMBULATORY MEDICATION CHARGE

## 2024-07-11 ENCOUNTER — PHARMACY VISIT (OUTPATIENT)
Dept: PHARMACY | Facility: CLINIC | Age: 39
End: 2024-07-11
Payer: MEDICARE

## 2024-07-18 ENCOUNTER — LAB (OUTPATIENT)
Dept: LAB | Facility: LAB | Age: 39
End: 2024-07-18
Payer: COMMERCIAL

## 2024-07-18 DIAGNOSIS — Z94.0 KIDNEY REPLACED BY TRANSPLANT (HHS-HCC): ICD-10-CM

## 2024-07-18 DIAGNOSIS — E21.3 HYPERPARATHYROIDISM (MULTI): ICD-10-CM

## 2024-07-18 LAB
ALBUMIN SERPL BCP-MCNC: 4.6 G/DL (ref 3.4–5)
ANION GAP SERPL CALC-SCNC: 11 MMOL/L (ref 10–20)
BUN SERPL-MCNC: 20 MG/DL (ref 6–23)
CALCIUM SERPL-MCNC: 10.1 MG/DL (ref 8.6–10.3)
CHLORIDE SERPL-SCNC: 104 MMOL/L (ref 98–107)
CO2 SERPL-SCNC: 28 MMOL/L (ref 21–32)
CREAT SERPL-MCNC: 1.17 MG/DL (ref 0.5–1.3)
CREAT UR-MCNC: 267.6 MG/DL (ref 20–370)
EGFRCR SERPLBLD CKD-EPI 2021: 80 ML/MIN/1.73M*2
ERYTHROCYTE [DISTWIDTH] IN BLOOD BY AUTOMATED COUNT: 13.4 % (ref 11.5–14.5)
GLUCOSE SERPL-MCNC: 79 MG/DL (ref 74–99)
HCT VFR BLD AUTO: 45.3 % (ref 41–52)
HGB BLD-MCNC: 15.1 G/DL (ref 13.5–17.5)
MAGNESIUM SERPL-MCNC: 1.74 MG/DL (ref 1.6–2.4)
MCH RBC QN AUTO: 29 PG (ref 26–34)
MCHC RBC AUTO-ENTMCNC: 33.3 G/DL (ref 32–36)
MCV RBC AUTO: 87 FL (ref 80–100)
NRBC BLD-RTO: 0 /100 WBCS (ref 0–0)
PHOSPHATE SERPL-MCNC: 3.3 MG/DL (ref 2.5–4.9)
PLATELET # BLD AUTO: 242 X10*3/UL (ref 150–450)
POTASSIUM SERPL-SCNC: 4.9 MMOL/L (ref 3.5–5.3)
PROT UR-ACNC: 16 MG/DL (ref 5–25)
PROT/CREAT UR: 0.06 MG/MG CREAT (ref 0–0.17)
RBC # BLD AUTO: 5.2 X10*6/UL (ref 4.5–5.9)
SODIUM SERPL-SCNC: 138 MMOL/L (ref 136–145)
WBC # BLD AUTO: 5.5 X10*3/UL (ref 4.4–11.3)

## 2024-07-18 PROCEDURE — 83970 ASSAY OF PARATHORMONE: CPT

## 2024-07-18 PROCEDURE — 82570 ASSAY OF URINE CREATININE: CPT

## 2024-07-18 PROCEDURE — 84156 ASSAY OF PROTEIN URINE: CPT

## 2024-07-18 PROCEDURE — 83735 ASSAY OF MAGNESIUM: CPT

## 2024-07-18 PROCEDURE — 80069 RENAL FUNCTION PANEL: CPT

## 2024-07-18 PROCEDURE — 85027 COMPLETE CBC AUTOMATED: CPT

## 2024-07-18 PROCEDURE — 36415 COLL VENOUS BLD VENIPUNCTURE: CPT

## 2024-07-18 PROCEDURE — 80158 DRUG ASSAY CYCLOSPORINE: CPT

## 2024-07-19 LAB
CYCLOSPORINE BLD IA-MCNC: 132 NG/ML (ref 80–210)
PTH-INTACT SERPL-MCNC: 118.4 PG/ML (ref 18.5–88)

## 2024-08-07 PROCEDURE — RXMED WILLOW AMBULATORY MEDICATION CHARGE

## 2024-08-08 ENCOUNTER — PHARMACY VISIT (OUTPATIENT)
Dept: PHARMACY | Facility: CLINIC | Age: 39
End: 2024-08-08
Payer: MEDICARE

## 2024-09-09 ENCOUNTER — SPECIALTY PHARMACY (OUTPATIENT)
Dept: PHARMACY | Facility: CLINIC | Age: 39
End: 2024-09-09

## 2024-09-09 PROCEDURE — RXMED WILLOW AMBULATORY MEDICATION CHARGE

## 2024-09-10 ENCOUNTER — PHARMACY VISIT (OUTPATIENT)
Dept: PHARMACY | Facility: CLINIC | Age: 39
End: 2024-09-10
Payer: MEDICARE

## 2024-10-03 PROCEDURE — RXMED WILLOW AMBULATORY MEDICATION CHARGE

## 2024-10-04 ENCOUNTER — SPECIALTY PHARMACY (OUTPATIENT)
Dept: PHARMACY | Facility: CLINIC | Age: 39
End: 2024-10-04

## 2024-10-04 PROCEDURE — RXMED WILLOW AMBULATORY MEDICATION CHARGE

## 2024-10-07 ENCOUNTER — PHARMACY VISIT (OUTPATIENT)
Dept: PHARMACY | Facility: CLINIC | Age: 39
End: 2024-10-07
Payer: MEDICARE

## 2024-10-10 ENCOUNTER — LAB (OUTPATIENT)
Dept: LAB | Facility: LAB | Age: 39
End: 2024-10-10
Payer: COMMERCIAL

## 2024-10-10 LAB
25(OH)D3 SERPL-MCNC: 30 NG/ML (ref 30–100)
ALBUMIN SERPL BCP-MCNC: 4.4 G/DL (ref 3.4–5)
ANION GAP SERPL CALC-SCNC: 12 MMOL/L (ref 10–20)
BUN SERPL-MCNC: 17 MG/DL (ref 6–23)
CALCIUM SERPL-MCNC: 9.8 MG/DL (ref 8.6–10.3)
CHLORIDE SERPL-SCNC: 101 MMOL/L (ref 98–107)
CO2 SERPL-SCNC: 29 MMOL/L (ref 21–32)
CREAT SERPL-MCNC: 1.15 MG/DL (ref 0.5–1.3)
CREAT UR-MCNC: 50.1 MG/DL (ref 20–370)
EGFRCR SERPLBLD CKD-EPI 2021: 82 ML/MIN/1.73M*2
ERYTHROCYTE [DISTWIDTH] IN BLOOD BY AUTOMATED COUNT: 13.2 % (ref 11.5–14.5)
GLUCOSE SERPL-MCNC: 64 MG/DL (ref 74–99)
HCT VFR BLD AUTO: 43.6 % (ref 41–52)
HGB BLD-MCNC: 14.5 G/DL (ref 13.5–17.5)
MAGNESIUM SERPL-MCNC: 1.55 MG/DL (ref 1.6–2.4)
MCH RBC QN AUTO: 28.8 PG (ref 26–34)
MCHC RBC AUTO-ENTMCNC: 33.3 G/DL (ref 32–36)
MCV RBC AUTO: 87 FL (ref 80–100)
NRBC BLD-RTO: 0 /100 WBCS (ref 0–0)
PHOSPHATE SERPL-MCNC: 3.3 MG/DL (ref 2.5–4.9)
PLATELET # BLD AUTO: 219 X10*3/UL (ref 150–450)
POTASSIUM SERPL-SCNC: 4.4 MMOL/L (ref 3.5–5.3)
PROT UR-ACNC: <4 MG/DL (ref 5–25)
PROT/CREAT UR: ABNORMAL MG/G{CREAT}
RBC # BLD AUTO: 5.03 X10*6/UL (ref 4.5–5.9)
SODIUM SERPL-SCNC: 138 MMOL/L (ref 136–145)
WBC # BLD AUTO: 4.8 X10*3/UL (ref 4.4–11.3)

## 2024-10-10 PROCEDURE — 82570 ASSAY OF URINE CREATININE: CPT

## 2024-10-10 PROCEDURE — 82306 VITAMIN D 25 HYDROXY: CPT

## 2024-10-10 PROCEDURE — 84156 ASSAY OF PROTEIN URINE: CPT

## 2024-10-10 PROCEDURE — 83735 ASSAY OF MAGNESIUM: CPT

## 2024-10-10 PROCEDURE — 80158 DRUG ASSAY CYCLOSPORINE: CPT

## 2024-10-10 PROCEDURE — 80069 RENAL FUNCTION PANEL: CPT

## 2024-10-10 PROCEDURE — 83036 HEMOGLOBIN GLYCOSYLATED A1C: CPT

## 2024-10-10 PROCEDURE — 83970 ASSAY OF PARATHORMONE: CPT

## 2024-10-10 PROCEDURE — 85027 COMPLETE CBC AUTOMATED: CPT

## 2024-10-11 ENCOUNTER — OFFICE VISIT (OUTPATIENT)
Dept: TRANSPLANT | Facility: HOSPITAL | Age: 39
End: 2024-10-11
Payer: COMMERCIAL

## 2024-10-11 VITALS
HEART RATE: 50 BPM | BODY MASS INDEX: 25.76 KG/M2 | WEIGHT: 169.4 LBS | TEMPERATURE: 97.4 F | SYSTOLIC BLOOD PRESSURE: 140 MMHG | DIASTOLIC BLOOD PRESSURE: 88 MMHG | OXYGEN SATURATION: 98 %

## 2024-10-11 DIAGNOSIS — M81.8 OTHER OSTEOPOROSIS WITHOUT CURRENT PATHOLOGICAL FRACTURE: ICD-10-CM

## 2024-10-11 DIAGNOSIS — Z94.0 KIDNEY REPLACED BY TRANSPLANT (HHS-HCC): Primary | ICD-10-CM

## 2024-10-11 DIAGNOSIS — Z94.0 IMMUNOSUPPRESSIVE MANAGEMENT ENCOUNTER FOLLOWING KIDNEY TRANSPLANT: ICD-10-CM

## 2024-10-11 DIAGNOSIS — I10 ESSENTIAL HYPERTENSION: ICD-10-CM

## 2024-10-11 DIAGNOSIS — Z79.899 IMMUNOSUPPRESSIVE MANAGEMENT ENCOUNTER FOLLOWING KIDNEY TRANSPLANT: ICD-10-CM

## 2024-10-11 LAB
CYCLOSPORINE BLD IA-MCNC: 125 NG/ML (ref 80–210)
EST. AVERAGE GLUCOSE BLD GHB EST-MCNC: 88 MG/DL
HBA1C MFR BLD: 4.7 %
PTH-INTACT SERPL-MCNC: 92.8 PG/ML (ref 18.5–88)

## 2024-10-11 PROCEDURE — 99214 OFFICE O/P EST MOD 30 MIN: CPT

## 2024-10-11 PROCEDURE — 3079F DIAST BP 80-89 MM HG: CPT | Performed by: INTERNAL MEDICINE

## 2024-10-11 PROCEDURE — 3077F SYST BP >= 140 MM HG: CPT | Performed by: INTERNAL MEDICINE

## 2024-10-11 SDOH — ECONOMIC STABILITY: FOOD INSECURITY: WITHIN THE PAST 12 MONTHS, THE FOOD YOU BOUGHT JUST DIDN'T LAST AND YOU DIDN'T HAVE MONEY TO GET MORE.: NEVER TRUE

## 2024-10-11 SDOH — ECONOMIC STABILITY: FOOD INSECURITY: WITHIN THE PAST 12 MONTHS, YOU WORRIED THAT YOUR FOOD WOULD RUN OUT BEFORE YOU GOT MONEY TO BUY MORE.: NEVER TRUE

## 2024-10-11 ASSESSMENT — ENCOUNTER SYMPTOMS
OCCASIONAL FEELINGS OF UNSTEADINESS: 0
DEPRESSION: 0
LOSS OF SENSATION IN FEET: 0

## 2024-10-11 ASSESSMENT — PATIENT HEALTH QUESTIONNAIRE - PHQ9
SUM OF ALL RESPONSES TO PHQ9 QUESTIONS 1 AND 2: 0
1. LITTLE INTEREST OR PLEASURE IN DOING THINGS: NOT AT ALL
2. FEELING DOWN, DEPRESSED OR HOPELESS: NOT AT ALL

## 2024-10-11 ASSESSMENT — LIFESTYLE VARIABLES
SKIP TO QUESTIONS 9-10: 1
HOW OFTEN DO YOU HAVE SIX OR MORE DRINKS ON ONE OCCASION: NEVER
AUDIT-C TOTAL SCORE: 1
HOW MANY STANDARD DRINKS CONTAINING ALCOHOL DO YOU HAVE ON A TYPICAL DAY: 1 OR 2
HOW OFTEN DO YOU HAVE A DRINK CONTAINING ALCOHOL: MONTHLY OR LESS

## 2024-10-11 ASSESSMENT — PAIN SCALES - GENERAL: PAINLEVEL: 0-NO PAIN

## 2024-10-11 NOTE — PROGRESS NOTES
TRANSPLANT NEPHROLOGY :   OUTPATIENT CLINIC NOTE      SERVICE DATE : 10/11/2024    REASON FOR VISIT/CHIEF COMPLAINT:  S/P  TRANSPLANT SURGERY  IMMUNOSUPPRESSIVE MEDICATION MANAGEMENT  BLOOD PRESSURE MANAGEMENT    HPI:    Mr. Sheppard is a 39 y.o. 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 Then he underwent 2nd DDKT on on 2020 [donor was increased risk. PRA of 37%  CMV D+/R- ; Hep C s/p treatment ,  hx of  seizure due to tacrolimus associated PRES in 2019 and tacrolimus was switched to Cyclosporine ].     Patient is here for follow up s/p kidney transplant.     Patient is doing well overall. No new complaints.  Last visit we ordered echo for HARRIS. Pt feels better so he did not complete it.     Patient is doing well overall. No new complaints. Denied chest pain, SOB, HARRIS, Palpitation. Normal urination and bowel movement. Normal gait and no weakness of arms/legs. No cough, runny nose, sore throat, cold symptoms, or rash. No hearing loss. Normal vision.No problems with his sleep, mood and function. No recent infection, hospitalization, surgery or ER visits.      ROS:  Review of  14 systems was performed system by system. See HPI. Otherwise, the symptoms were negative.    PAST MEDICAL HISTORY:  Past Medical History:   Diagnosis Date    Alport syndrome ([facility]) 2016    Alport syndrome    Other long term (current) drug therapy 2020    High risk medication use        PAST SURGICAL HISTORY:  Past Surgical History:   Procedure Laterality Date    OTHER SURGICAL HISTORY  02/10/2020    Kidney transplantation        SOCIAL HISTORY:  Social History     Socioeconomic History    Marital status:      Spouse name: Not on file    Number of children: Not on file    Years of education: Not on file    Highest education level: Not  on file   Occupational History    Not on file   Tobacco Use    Smoking status: Never    Smokeless tobacco: Never   Substance and Sexual Activity    Alcohol use: Yes    Drug use: Yes     Types: Marijuana    Sexual activity: Not on file   Other Topics Concern    Not on file   Social History Narrative    Not on file     Social Determinants of Health     Financial Resource Strain: Not on file   Food Insecurity: No Food Insecurity (10/11/2024)    Hunger Vital Sign     Worried About Running Out of Food in the Last Year: Never true     Ran Out of Food in the Last Year: Never true   Transportation Needs: Not on file   Physical Activity: Not on file   Stress: No Stress Concern Present (10/11/2024)    [employer] - Occupational Stress Questionnaire     Feeling of Stress : Not at all   Social Connections: Not on file   Intimate Partner Violence: Not on file   Housing Stability: Not on file       FAMILY HISTORY:  Family History   Problem Relation Name Age of Onset    No Known Problems Mother      No Known Problems Father         MEDICATION LIST:  Current Outpatient Medications   Medication Instructions    allopurinol (ZYLOPRIM) 50 mg, oral, Daily    cholecalciferol (VITAMIN D3) 50 mcg, oral, Daily    cloNIDine (Catapres) 0.1 mg tablet TAKE ONE (1) TABLET BY MOUTH DAILY.    cycloSPORINE modified (NEORAL) 25 mg, oral, 2 times daily    cycloSPORINE modified (NEORAL) 100 mg, oral, Every 12 hours    melatonin 3 mg, oral, Nightly PRN    metoprolol tartrate (Lopressor) 100 mg tablet TAKE ONE (1) TABLET BY MOUTH EVERY 12 HOURS. HOLD DOSE IF SYSTOLIC BLOOD PRESSURE<110 OR HEART RATE<60    mometasone (Elocon) 0.1 % ointment Topical, Daily, Apply a thin layer to affected areas on face at night. Wash hands thoroughly after applying.    mycophenolate (Cellcept) 250 mg capsule TAKE THREE (3) CAPSULES BY MOUTH EVERY 12 HOURS.    NIFEdipine ER (Adalat CC) 30 mg 24 hr tablet TAKE ONE (1) TABLET BY MOUTH TWICE DAILY.     predniSONE (Deltasone) 5 mg tablet TAKE ONE (1) TABLET BY MOUTH DAILY.       ALLERGY  Allergies   Allergen Reactions    Lymphocyte Immune Globulin,Antithymocyte (Equine) Unknown       PHYSICAL EXAM:    Visit Vitals  /88   Pulse 50   Temp 36.3 °C (97.4 °F) (Temporal)   Wt 76.8 kg (169 lb 6.4 oz)   SpO2 98%   BMI 25.76 kg/m²   Smoking Status Never   BSA 1.92 m²          Vital signs - reviewed. Acceptable BP at this office visit.   General Appearance - NAD, Good speech, oriented and alert  HEENT - Supple. Not pale. No jaundice. No cervical lymphadenopathy. Pharynx and tonsils are not injected.  CVS - RRR. Normal S1/S2. No murmur, click , rub or gallop  Lungs- clear to auscultation bilaterally  Abdomen - soft , not tender, no guarding, no rigidity. No hepatosplenomegaly. Normal bowel sounds. No masses and ascites. S/P Kidney transplant .  Transplanted kidney is not tender.   Musculoskeletal /Extremities - no edema. Full ROM. No joint tenderness.   Neuro/Psych - appropriate mood and affect. Motor power V/V all extremities. CN I -XII were grossly intact.  Skin - No visible rash      LABS:    Lab Results   Component Value Date    WBC 4.8 10/10/2024    HGB 14.5 10/10/2024    HCT 43.6 10/10/2024     10/10/2024    CHOL 228 (H) 02/13/2019    TRIG 545 (H) 02/13/2019    HDL 31.3 (A) 02/13/2019    ALT 20 07/28/2020    AST 18 07/28/2020     10/10/2024    K 4.4 10/10/2024     10/10/2024    CREATININE 1.15 10/10/2024    BUN 17 10/10/2024    CO2 29 10/10/2024    INR 1.0 03/11/2020    HGBA1C 4.7 10/10/2024     par    ASSESSMENT AND PLAN:    Mr. Sheppard is a 39 y.o. male  who is here for follow up s/p kidney transplant.    TRANSPLANT DATE: 1/30/2020 (Kidney), 9/23/2012 (Kidney), 10/31/2012 (Kidney)      1. ESRD S/P kidney transplant   - Creatinine last check was :  Lab Results   Component Value Date    CREATININE 1.15 10/10/2024       - Renal allograft function is stable.   -UPC ratio -WNL  -Ensure adequate  "hydration  - Avoid nephrotoxic medications, NSAIDs, and IV contrast.    2. Immunosuppression  -Tacrolimus level last check was PENDING. Takes cyclosporine at 10-11 am/pm. It was 12 hour trough level. Goal ~100 due to 3rd kidney tx  -  mg bid  -Prednisone 5 mg daily  -Continue current immunosuppression regimen.    3. Electrolytes  Lab Results   Component Value Date    GLUCOSE 64 (L) 10/10/2024    CALCIUM 9.8 10/10/2024     10/10/2024    K 4.4 10/10/2024    CO2 29 10/10/2024     10/10/2024    BUN 17 10/10/2024    CREATININE 1.15 10/10/2024     -Acceptable from last lab drawn    4. Hypertension  Blood Pressures         10/11/2024  0805             BP: 140/88          -Home  BP had been acceptable  -Encourage to monitor home BP  -Continue current anti hypertensive medication    5. Bone Mineral Disease/Osteoporosis  Lab Results   Component Value Date    PTH 92.8 (H) 10/10/2024    CALCIUM 9.8 10/10/2024    CAION 1.31 02/01/2020    PHOS 3.3 10/10/2024    VITD25 30 10/10/2024     -check VIT D, PTH with next lab  - Consider DEXA every 2-3 years , defer to PCP  - May consider initiation of Sensipar when PTH >300 AND Ca >8.4    6.Anemia  Lab Results   Component Value Date    WBC 4.8 10/10/2024    HGB 14.5 10/10/2024    HCT 43.6 10/10/2024    MCV 87 10/10/2024     10/10/2024     No results found for: \"IRON\", \"TIBC\", \"FERRITIN\"  -asymptomatic  - Continue to monitor  -check iron studies and ferritin. Will consider CRUZ as needed.  - No indications for blood transfusion     7.Health maintenance and vaccination  - Flu shot during flu season annually  - Cancer screening is up to date per the patient    Summary  Increase high magnesium diet  Over the counter Ca 600/vitamin D 800 units, 2 tabs daily  Follow up CSA level which is pending from yesterday  Bone density test, call [phone] to schedule. Can be done at local  radiology.  Continue lab every 3 months  Next follow up 6 months      Zack" MD Emilee    Transplant Nephrology

## 2024-10-11 NOTE — PATIENT INSTRUCTIONS
Increase high magnesium diet  Over the counter Ca 600/vitamin D 800 units, 2 tabs daily  Bone density test, call [phone] to schedule. Can be done at local  radiology.  Continue lab every 3 months  Next follow up 6 months

## 2024-10-22 DIAGNOSIS — Z94.0 KIDNEY REPLACED BY TRANSPLANT (HHS-HCC): ICD-10-CM

## 2025-01-03 ENCOUNTER — SPECIALTY PHARMACY (OUTPATIENT)
Dept: PHARMACY | Facility: CLINIC | Age: 40
End: 2025-01-03

## 2025-01-03 PROCEDURE — RXMED WILLOW AMBULATORY MEDICATION CHARGE

## 2025-01-07 ENCOUNTER — PHARMACY VISIT (OUTPATIENT)
Dept: PHARMACY | Facility: CLINIC | Age: 40
End: 2025-01-07
Payer: MEDICARE

## 2025-01-07 ENCOUNTER — SPECIALTY PHARMACY (OUTPATIENT)
Dept: PHARMACY | Facility: CLINIC | Age: 40
End: 2025-01-07

## 2025-01-08 PROCEDURE — RXMED WILLOW AMBULATORY MEDICATION CHARGE

## 2025-01-09 ENCOUNTER — SPECIALTY PHARMACY (OUTPATIENT)
Dept: PHARMACY | Facility: CLINIC | Age: 40
End: 2025-01-09

## 2025-01-16 ENCOUNTER — SPECIALTY PHARMACY (OUTPATIENT)
Dept: PHARMACY | Facility: CLINIC | Age: 40
End: 2025-01-16

## 2025-01-17 ENCOUNTER — PHARMACY VISIT (OUTPATIENT)
Dept: PHARMACY | Facility: CLINIC | Age: 40
End: 2025-01-17
Payer: MEDICARE

## 2025-02-07 DIAGNOSIS — Z94.0 IMMUNOSUPPRESSIVE MANAGEMENT ENCOUNTER FOLLOWING KIDNEY TRANSPLANT: ICD-10-CM

## 2025-02-07 DIAGNOSIS — E55.9 VITAMIN D DEFICIENCY: ICD-10-CM

## 2025-02-07 DIAGNOSIS — R06.09 DOE (DYSPNEA ON EXERTION): ICD-10-CM

## 2025-02-07 DIAGNOSIS — Z94.0 KIDNEY REPLACED BY TRANSPLANT (HHS-HCC): ICD-10-CM

## 2025-02-07 DIAGNOSIS — I10 ESSENTIAL HYPERTENSION: ICD-10-CM

## 2025-02-07 DIAGNOSIS — Z79.899 IMMUNOSUPPRESSIVE MANAGEMENT ENCOUNTER FOLLOWING KIDNEY TRANSPLANT: ICD-10-CM

## 2025-02-07 DIAGNOSIS — E21.3 HYPERPARATHYROIDISM (MULTI): ICD-10-CM

## 2025-02-07 RX ORDER — CYCLOSPORINE 100 MG/1
100 CAPSULE, LIQUID FILLED ORAL EVERY 12 HOURS
Qty: 60 CAPSULE | Refills: 11 | Status: SHIPPED | OUTPATIENT
Start: 2025-02-07 | End: 2026-02-07

## 2025-02-07 RX ORDER — PREDNISONE 5 MG/1
5 TABLET ORAL DAILY
Qty: 90 TABLET | Refills: 3 | Status: SHIPPED | OUTPATIENT
Start: 2025-02-07 | End: 2026-02-06

## 2025-02-07 RX ORDER — ALLOPURINOL 100 MG/1
50 TABLET ORAL DAILY
Qty: 45 TABLET | Refills: 3 | Status: SHIPPED | OUTPATIENT
Start: 2025-02-07 | End: 2026-02-07

## 2025-02-07 RX ORDER — NIFEDIPINE 30 MG/1
30 TABLET, FILM COATED, EXTENDED RELEASE ORAL 2 TIMES DAILY
Qty: 180 TABLET | Refills: 3 | OUTPATIENT
Start: 2025-02-07 | End: 2026-02-06

## 2025-02-07 RX ORDER — MYCOPHENOLATE MOFETIL 250 MG/1
750 CAPSULE ORAL EVERY 12 HOURS
Qty: 540 CAPSULE | Refills: 3 | Status: SHIPPED | OUTPATIENT
Start: 2025-02-07 | End: 2026-02-06

## 2025-02-07 RX ORDER — METOPROLOL TARTRATE 100 MG/1
TABLET ORAL
Qty: 180 TABLET | Refills: 3 | Status: SHIPPED | OUTPATIENT
Start: 2025-02-07 | End: 2026-02-06

## 2025-02-07 RX ORDER — CLONIDINE HYDROCHLORIDE 0.1 MG/1
0.1 TABLET ORAL DAILY
Qty: 90 TABLET | Refills: 3 | Status: SHIPPED | OUTPATIENT
Start: 2025-02-07 | End: 2026-02-06

## 2025-02-07 RX ORDER — CYCLOSPORINE 25 MG/1
25 CAPSULE, LIQUID FILLED ORAL 2 TIMES DAILY
Qty: 60 CAPSULE | Refills: 11 | OUTPATIENT
Start: 2025-02-07 | End: 2026-02-06

## 2025-02-20 ENCOUNTER — LAB (OUTPATIENT)
Dept: LAB | Facility: HOSPITAL | Age: 40
End: 2025-02-20
Payer: COMMERCIAL

## 2025-02-20 DIAGNOSIS — E21.3 HYPERPARATHYROIDISM (MULTI): ICD-10-CM

## 2025-02-20 PROCEDURE — 83970 ASSAY OF PARATHORMONE: CPT

## 2025-02-21 LAB — PTH-INTACT SERPL-MCNC: NORMAL PG/ML

## 2025-03-04 ENCOUNTER — SPECIALTY PHARMACY (OUTPATIENT)
Dept: PHARMACY | Facility: CLINIC | Age: 40
End: 2025-03-04

## 2025-03-05 ENCOUNTER — TELEPHONE (OUTPATIENT)
Dept: TRANSPLANT | Facility: CLINIC | Age: 40
End: 2025-03-05
Payer: COMMERCIAL

## 2025-03-05 DIAGNOSIS — Z94.0 KIDNEY REPLACED BY TRANSPLANT (HHS-HCC): ICD-10-CM

## 2025-03-05 NOTE — TELEPHONE ENCOUNTER
Patient called requesting to speak with coordinator about  confusion with lab draws. He only had one tube of blood drawn last week and only one result came back, did not say what the result was. .  Patient call back number is [phone] .

## 2025-03-06 NOTE — TELEPHONE ENCOUNTER
Call returned to patient, asked if he could repeat labs. Updated lab orders. Asked patient to call if has any more problems at the lab.

## 2025-03-12 ENCOUNTER — APPOINTMENT (OUTPATIENT)
Dept: LAB | Facility: HOSPITAL | Age: 40
End: 2025-03-12
Payer: COMMERCIAL

## 2025-03-12 ENCOUNTER — LAB (OUTPATIENT)
Dept: LAB | Facility: HOSPITAL | Age: 40
End: 2025-03-12
Payer: COMMERCIAL

## 2025-03-12 LAB
ALBUMIN SERPL BCP-MCNC: 4.4 G/DL (ref 3.4–5)
ANION GAP SERPL CALC-SCNC: 13 MMOL/L (ref 10–20)
BUN SERPL-MCNC: 21 MG/DL (ref 6–23)
CALCIUM SERPL-MCNC: 10 MG/DL (ref 8.6–10.3)
CHLORIDE SERPL-SCNC: 102 MMOL/L (ref 98–107)
CO2 SERPL-SCNC: 27 MMOL/L (ref 21–32)
CREAT SERPL-MCNC: 1.08 MG/DL (ref 0.5–1.3)
CREAT UR-MCNC: 57.5 MG/DL (ref 20–370)
EGFRCR SERPLBLD CKD-EPI 2021: 88 ML/MIN/1.73M*2
ERYTHROCYTE [DISTWIDTH] IN BLOOD BY AUTOMATED COUNT: 12.9 % (ref 11.5–14.5)
GLUCOSE SERPL-MCNC: 87 MG/DL (ref 74–99)
HCT VFR BLD AUTO: 43.2 % (ref 41–52)
HGB BLD-MCNC: 15.1 G/DL (ref 13.5–17.5)
MCH RBC QN AUTO: 29.3 PG (ref 26–34)
MCHC RBC AUTO-ENTMCNC: 35 G/DL (ref 32–36)
MCV RBC AUTO: 84 FL (ref 80–100)
NRBC BLD-RTO: 0 /100 WBCS (ref 0–0)
PHOSPHATE SERPL-MCNC: 3.3 MG/DL (ref 2.5–4.9)
PLATELET # BLD AUTO: 215 X10*3/UL (ref 150–450)
POTASSIUM SERPL-SCNC: 4.5 MMOL/L (ref 3.5–5.3)
PROT UR-ACNC: <4 MG/DL (ref 5–25)
PROT/CREAT UR: ABNORMAL MG/G{CREAT}
RBC # BLD AUTO: 5.15 X10*6/UL (ref 4.5–5.9)
SODIUM SERPL-SCNC: 137 MMOL/L (ref 136–145)
WBC # BLD AUTO: 5.1 X10*3/UL (ref 4.4–11.3)

## 2025-03-12 PROCEDURE — 80069 RENAL FUNCTION PANEL: CPT

## 2025-03-12 PROCEDURE — 85027 COMPLETE CBC AUTOMATED: CPT

## 2025-03-12 PROCEDURE — 82570 ASSAY OF URINE CREATININE: CPT

## 2025-03-12 PROCEDURE — 82306 VITAMIN D 25 HYDROXY: CPT

## 2025-03-12 PROCEDURE — 84156 ASSAY OF PROTEIN URINE: CPT

## 2025-03-12 PROCEDURE — 83970 ASSAY OF PARATHORMONE: CPT

## 2025-03-13 LAB
25(OH)D3 SERPL-MCNC: 44 NG/ML (ref 30–100)
PTH-INTACT SERPL-MCNC: 83.7 PG/ML (ref 18.5–88)

## 2025-03-28 DIAGNOSIS — Z94.0 KIDNEY REPLACED BY TRANSPLANT (HHS-HCC): ICD-10-CM

## 2025-03-31 PROCEDURE — RXMED WILLOW AMBULATORY MEDICATION CHARGE

## 2025-04-03 ENCOUNTER — PHARMACY VISIT (OUTPATIENT)
Dept: PHARMACY | Facility: CLINIC | Age: 40
End: 2025-04-03
Payer: MEDICARE

## 2025-04-11 ENCOUNTER — OFFICE VISIT (OUTPATIENT)
Facility: HOSPITAL | Age: 40
End: 2025-04-11
Payer: COMMERCIAL

## 2025-04-11 VITALS
SYSTOLIC BLOOD PRESSURE: 129 MMHG | TEMPERATURE: 97.7 F | BODY MASS INDEX: 26.52 KG/M2 | WEIGHT: 174.4 LBS | HEART RATE: 55 BPM | DIASTOLIC BLOOD PRESSURE: 80 MMHG | OXYGEN SATURATION: 97 %

## 2025-04-11 DIAGNOSIS — Z94.0 IMMUNOSUPPRESSIVE MANAGEMENT ENCOUNTER FOLLOWING KIDNEY TRANSPLANT: ICD-10-CM

## 2025-04-11 DIAGNOSIS — Z79.899 IMMUNOSUPPRESSIVE MANAGEMENT ENCOUNTER FOLLOWING KIDNEY TRANSPLANT: ICD-10-CM

## 2025-04-11 DIAGNOSIS — Z94.0 KIDNEY REPLACED BY TRANSPLANT (HHS-HCC): Primary | ICD-10-CM

## 2025-04-11 DIAGNOSIS — I10 ESSENTIAL HYPERTENSION: ICD-10-CM

## 2025-04-11 DIAGNOSIS — Z48.298 AFTERCARE FOLLOWING ORGAN TRANSPLANT: ICD-10-CM

## 2025-04-11 DIAGNOSIS — E79.0 HYPERURICEMIA: ICD-10-CM

## 2025-04-11 DIAGNOSIS — Z92.25 PERSONAL HISTORY OF IMMUNOSUPRESSION THERAPY: ICD-10-CM

## 2025-04-11 PROCEDURE — 99215 OFFICE O/P EST HI 40 MIN: CPT

## 2025-04-11 PROCEDURE — 3079F DIAST BP 80-89 MM HG: CPT | Performed by: STUDENT IN AN ORGANIZED HEALTH CARE EDUCATION/TRAINING PROGRAM

## 2025-04-11 PROCEDURE — 3074F SYST BP LT 130 MM HG: CPT | Performed by: STUDENT IN AN ORGANIZED HEALTH CARE EDUCATION/TRAINING PROGRAM

## 2025-04-11 PROCEDURE — G2211 COMPLEX E/M VISIT ADD ON: HCPCS

## 2025-04-11 RX ORDER — CYCLOSPORINE 100 MG/1
100 CAPSULE, LIQUID FILLED ORAL EVERY 12 HOURS
Qty: 180 CAPSULE | Refills: 3 | Status: SHIPPED | OUTPATIENT
Start: 2025-04-11 | End: 2026-04-11

## 2025-04-11 RX ORDER — NIFEDIPINE 30 MG/1
30 TABLET, FILM COATED, EXTENDED RELEASE ORAL 2 TIMES DAILY
Qty: 180 TABLET | Refills: 3 | Status: SHIPPED | OUTPATIENT
Start: 2025-04-11 | End: 2026-04-10

## 2025-04-11 RX ORDER — MYCOPHENOLATE MOFETIL 250 MG/1
1000 CAPSULE ORAL EVERY 12 HOURS
Qty: 720 CAPSULE | Refills: 3 | Status: SHIPPED | OUTPATIENT
Start: 2025-04-11 | End: 2026-04-11

## 2025-04-11 RX ORDER — CYCLOSPORINE 25 MG/1
25 CAPSULE, LIQUID FILLED ORAL 2 TIMES DAILY
Qty: 180 CAPSULE | Refills: 3 | Status: SHIPPED | OUTPATIENT
Start: 2025-04-11 | End: 2026-04-11

## 2025-04-11 ASSESSMENT — PAIN SCALES - GENERAL: PAINLEVEL_OUTOF10: 0-NO PAIN

## 2025-04-11 NOTE — PROGRESS NOTES
TRANSPLANT NEPHROLOGY :   OUTPATIENT CLINIC NOTE      SERVICE DATE : 04/11/2025    REASON FOR VISIT/CHIEF COMPLAINT:  S/P  TRANSPLANT SURGERY  IMMUNOSUPPRESSIVE MEDICATION MANAGEMENT  BLOOD PRESSURE MANAGEMENT    HPI:    Mr. Sheppard is a 40 y.o. male with past medical history significant for ESKD due to Alport syndrome s/p DDKT#1 in 2012 at OSH lasting 11.5 years then failed due to rejection, s/p DDKT #3 1/30/22 PRA 37%, Donor HCV s/p DAA, history of seizure attributed to tacrolimus associated PRES 12/2019 so switched to cyclosporine.  Here for follow up post-transplant.    5 years 2 months out  Doing well. No complaints. No tremors or HA.  Compliant with medications.  Note low heart rates but no orthostatic symptoms or dizziness.    Patient is doing well overall. No new complaints. Denied chest pain, SOB, HARRIS, Palpitation. Normal urination and bowel movement. Normal gait and no weakness of arms/legs. No cough, runny nose, sore throat, cold symptoms, or rash. No hearing loss. Normal vision.No problems with his sleep, mood and function. No recent infection, hospitalization, surgery or ER visits.      ROS:  Review of  14 systems was performed system by system. See HPI. Otherwise, the symptoms were negative.    PAST MEDICAL HISTORY:  Past Medical History:   Diagnosis Date    Alport syndrome ([facility] [place]) 12/16/2016    Alport syndrome    Other long term (current) drug therapy 06/24/2020    High risk medication use        PAST SURGICAL HISTORY:  Past Surgical History:   Procedure Laterality Date    OTHER SURGICAL HISTORY  02/10/2020    Kidney transplantation        SOCIAL HISTORY:  Social History     Socioeconomic History    Marital status:      Spouse name: Not on file    Number of children: Not on file    Years of education: Not on file    Highest education level: Not on file   Occupational History    Not on file   Tobacco Use    Smoking status: Never    Smokeless tobacco: Never   Substance and Sexual  Activity    Alcohol use: Yes    Drug use: Yes     Types: Marijuana    Sexual activity: Not on file   Other Topics Concern    Not on file   Social History Narrative    Not on file     Social Drivers of Health     Financial Resource Strain: Not on file   Food Insecurity: No Food Insecurity (10/11/2024)    Hunger Vital Sign     Worried About Running Out of Food in the Last Year: Never true     Ran Out of Food in the Last Year: Never true   Transportation Needs: Not on file   Physical Activity: Not on file   Stress: No Stress Concern Present (10/11/2024)    [employer] - Occupational Stress Questionnaire     Feeling of Stress : Not at all   Social Connections: Not on file   Intimate Partner Violence: Not on file   Housing Stability: Not on file       FAMILY HISTORY:  Family History   Problem Relation Name Age of Onset    No Known Problems Mother      No Known Problems Father         MEDICATION LIST:  Current Outpatient Medications   Medication Instructions    allopurinol (ZYLOPRIM) 50 mg, oral, Daily    cholecalciferol (VITAMIN D3) 50 mcg, oral, Daily    cloNIDine (Catapres) 0.1 mg tablet TAKE ONE (1) TABLET BY MOUTH DAILY.    cycloSPORINE modified (NEORAL) 25 mg, oral, 2 times daily    cycloSPORINE modified (NEORAL) 100 mg, oral, Every 12 hours    melatonin 3 mg, oral, Nightly PRN    metoprolol tartrate (Lopressor) 100 mg tablet TAKE ONE (1) TABLET BY MOUTH EVERY 12 HOURS. HOLD DOSE IF SYSTOLIC BLOOD PRESSURE<110 OR HEART RATE<60    mometasone (Elocon) 0.1 % ointment Topical, Daily, Apply a thin layer to affected areas on face at night. Wash hands thoroughly after applying.    mycophenolate (Cellcept) 250 mg capsule TAKE THREE (3) CAPSULES BY MOUTH EVERY 12 HOURS.    NIFEdipine ER (Adalat CC) 30 mg 24 hr tablet TAKE ONE (1) TABLET BY MOUTH TWICE DAILY.    predniSONE (Deltasone) 5 mg tablet TAKE ONE (1) TABLET BY MOUTH DAILY.       ALLERGY  Allergies   Allergen Reactions    Lymphocyte Immune  Globulin,Antithymocyte (Equine) Unknown       PHYSICAL EXAM:    Vitals:    04/11/25 0803   BP: 129/80   Pulse: 55   Temp: 36.5 °C (97.7 °F)   SpO2: 97%       Vital signs - reviewed. Acceptable BP at this office visit.   General Appearance - NAD, Good speech, oriented and alert  HEENT - Supple. Not pale. No jaundice. CVS - RRR. Normal S1/S2. No murmur, click , rub or gallop  Lungs- clear to auscultation bilaterally  Abdomen - soft , not tender, no guarding, no rigidity.  S/P Kidney transplant.  Transplanted kidney is not tender.  New allograft is in the LLQ.  Musculoskeletal /Extremities - no edema. Full ROM. No joint tenderness.   Neuro/Psych - appropriate mood and affect. Motor power V/V all extremities. CN I -XII were grossly intact.  Skin - No visible rash      LABS:    Lab Results   Component Value Date    WBC 5.1 03/12/2025    HGB 15.1 03/12/2025    HCT 43.2 03/12/2025     03/12/2025    CHOL 228 (H) 02/13/2019    TRIG 545 (H) 02/13/2019    HDL 31.3 (A) 02/13/2019    ALT 20 07/28/2020    AST 18 07/28/2020     03/12/2025    K 4.5 03/12/2025     03/12/2025    CREATININE 1.08 03/12/2025    BUN 21 03/12/2025    CO2 27 03/12/2025    INR 1.0 03/11/2020    HGBA1C 4.7 10/10/2024       ASSESSMENT AND PLAN:    Mr. Sheppard is a 40 y.o. male  who is here for follow up s/p kidney transplant.    TRANSPLANT DATE: 1/30/2020 (Kidney), 9/23/2012 (Kidney), 10/31/2012 (Kidney)      1. ESRD S/P kidney transplant   - Creatinine last check was :  Lab Results   Component Value Date    CREATININE 1.08 03/12/2025     - Renal allograft function is excellent. Baseline Cr 0.9-1.1. No proteinuria.  -Ensure adequate hydration  - Avoid nephrotoxic medications, NSAIDs, and IV contrast.    2. Immunosuppression  -CsA level last check was due - goal 100-200  -Continue current immunosuppression regimen.  NEOral 125 mg BID   mg BID  Prednisone 5 mg/day    3. Electrolytes  Lab Results   Component Value Date    GLUCOSE 87  03/12/2025    CALCIUM 10.0 03/12/2025     03/12/2025    K 4.5 03/12/2025    CO2 27 03/12/2025     03/12/2025    BUN 21 03/12/2025    CREATININE 1.08 03/12/2025     -Acceptable from last lab drawn    Hyperuricemia - follow uric acid    4. Hypertension  -Home  BP had been acceptable  -Encourage to monitor home BP  -Continue current anti hypertensive medication  Clonidine 0.1 mg/day --> STOP  Nifedipine 30 mg BID  Lopressor 100 mg/day    5. Bone Mineral Disease/Osteoporosis  Lab Results   Component Value Date    PTH 83.7 03/12/2025    CALCIUM 10.0 03/12/2025    CAION 1.31 02/01/2020    PHOS 3.3 03/12/2025    VITD25 44 03/12/2025   Maintenance vitamin D + Ca supplement  DEXA to be scheduled    6.Anemia  Lab Results   Component Value Date    WBC 5.1 03/12/2025    HGB 15.1 03/12/2025    HCT 43.2 03/12/2025    MCV 84 03/12/2025     03/12/2025   At goal    7.Health maintenance and vaccination  - Flu shot during flu season annually  - Cancer screening is up to date per the patient  - Skin cancer precautions: sunscreen and long-sleeved clothes, regular self exam, whole body skin exam by dermatologist annually or biennially  - CVD prevention: lipid profile and A1c at next visit    Lab : Routine transplant lab ( CBC, RFP, and anti-rejection trough level, UPCR) every 3 months  VIT D, PTH q6mo  Lipid profile, A1c q12mo    Additional Plan :  - Stop clonidine. Expect rebound hypertension for a few days. May increase nifedipine to 60/30 temporarily. Goal home BP < 130/80  - Increase MMF to 1000 mg BID  - Needs cyclosporine trough level    RTC 6 month(s)    Matt Mckenna MD  Transplant Nephrology

## 2025-04-11 NOTE — PATIENT INSTRUCTIONS
PLAN:   Increase MMF to 1000 mg BID  Stop clonidine. BP can shoot up for the next 3-5 days. If BP>150/90, double nifedipine to 60 mg BID temporarily.  Labs every 3 months  RTC 6 months

## 2025-04-23 PROCEDURE — RXMED WILLOW AMBULATORY MEDICATION CHARGE

## 2025-04-25 DIAGNOSIS — Z94.0 KIDNEY REPLACED BY TRANSPLANT (HHS-HCC): ICD-10-CM

## 2025-04-30 ENCOUNTER — PHARMACY VISIT (OUTPATIENT)
Dept: PHARMACY | Facility: CLINIC | Age: 40
End: 2025-04-30
Payer: MEDICARE

## 2025-05-23 DIAGNOSIS — Z94.0 KIDNEY REPLACED BY TRANSPLANT (HHS-HCC): ICD-10-CM

## 2025-06-09 ENCOUNTER — SPECIALTY PHARMACY (OUTPATIENT)
Dept: PHARMACY | Facility: CLINIC | Age: 40
End: 2025-06-09

## 2025-06-16 PROCEDURE — RXMED WILLOW AMBULATORY MEDICATION CHARGE

## 2025-06-19 ENCOUNTER — PHARMACY VISIT (OUTPATIENT)
Dept: PHARMACY | Facility: CLINIC | Age: 40
End: 2025-06-19
Payer: MEDICARE

## 2025-06-19 ENCOUNTER — SPECIALTY PHARMACY (OUTPATIENT)
Dept: PHARMACY | Facility: CLINIC | Age: 40
End: 2025-06-19

## 2025-06-19 PROCEDURE — RXMED WILLOW AMBULATORY MEDICATION CHARGE

## 2025-06-20 DIAGNOSIS — Z94.0 KIDNEY REPLACED BY TRANSPLANT (HHS-HCC): ICD-10-CM

## 2025-07-07 ENCOUNTER — SPECIALTY PHARMACY (OUTPATIENT)
Dept: PHARMACY | Facility: CLINIC | Age: 40
End: 2025-07-07

## 2025-07-11 DIAGNOSIS — Z94.0 KIDNEY REPLACED BY TRANSPLANT (HHS-HCC): ICD-10-CM

## 2025-07-11 DIAGNOSIS — Z48.298 AFTERCARE FOLLOWING ORGAN TRANSPLANT: ICD-10-CM

## 2025-07-11 DIAGNOSIS — I10 ESSENTIAL HYPERTENSION: ICD-10-CM

## 2025-07-11 DIAGNOSIS — Z94.0 IMMUNOSUPPRESSIVE MANAGEMENT ENCOUNTER FOLLOWING KIDNEY TRANSPLANT: ICD-10-CM

## 2025-07-11 DIAGNOSIS — E79.0 HYPERURICEMIA: ICD-10-CM

## 2025-07-11 DIAGNOSIS — Z79.899 IMMUNOSUPPRESSIVE MANAGEMENT ENCOUNTER FOLLOWING KIDNEY TRANSPLANT: ICD-10-CM

## 2025-07-18 DIAGNOSIS — Z94.0 KIDNEY REPLACED BY TRANSPLANT (HHS-HCC): ICD-10-CM

## 2025-07-23 ENCOUNTER — SPECIALTY PHARMACY (OUTPATIENT)
Dept: PHARMACY | Facility: CLINIC | Age: 40
End: 2025-07-23

## 2025-07-23 PROCEDURE — RXMED WILLOW AMBULATORY MEDICATION CHARGE

## 2025-07-28 ENCOUNTER — PHARMACY VISIT (OUTPATIENT)
Dept: PHARMACY | Facility: CLINIC | Age: 40
End: 2025-07-28
Payer: MEDICARE

## 2025-08-15 DIAGNOSIS — Z94.0 KIDNEY REPLACED BY TRANSPLANT (HHS-HCC): ICD-10-CM

## 2025-08-22 ENCOUNTER — SPECIALTY PHARMACY (OUTPATIENT)
Dept: PHARMACY | Facility: CLINIC | Age: 40
End: 2025-08-22

## 2025-08-26 ENCOUNTER — SPECIALTY PHARMACY (OUTPATIENT)
Dept: PHARMACY | Facility: CLINIC | Age: 40
End: 2025-08-26

## 2025-08-26 ENCOUNTER — PHARMACY VISIT (OUTPATIENT)
Dept: PHARMACY | Facility: CLINIC | Age: 40
End: 2025-08-26
Payer: MEDICARE

## 2025-08-26 PROCEDURE — RXMED WILLOW AMBULATORY MEDICATION CHARGE

## 2025-09-04 ENCOUNTER — LAB (OUTPATIENT)
Dept: LAB | Facility: HOSPITAL | Age: 40
End: 2025-09-04
Payer: COMMERCIAL

## 2025-09-04 LAB
25(OH)D3 SERPL-MCNC: 44 NG/ML (ref 30–100)
ALBUMIN SERPL BCP-MCNC: 4.8 G/DL (ref 3.4–5)
ANION GAP SERPL CALC-SCNC: 12 MMOL/L (ref 10–20)
BUN SERPL-MCNC: 17 MG/DL (ref 6–23)
CALCIUM SERPL-MCNC: 10.6 MG/DL (ref 8.6–10.6)
CHLORIDE SERPL-SCNC: 99 MMOL/L (ref 98–107)
CHOLEST SERPL-MCNC: 239 MG/DL (ref 0–199)
CHOLESTEROL/HDL RATIO: 5
CO2 SERPL-SCNC: 30 MMOL/L (ref 21–32)
CREAT SERPL-MCNC: 0.99 MG/DL (ref 0.5–1.3)
CREAT UR-MCNC: 51.5 MG/DL (ref 20–370)
EGFRCR SERPLBLD CKD-EPI 2021: >90 ML/MIN/1.73M*2
ERYTHROCYTE [DISTWIDTH] IN BLOOD BY AUTOMATED COUNT: 13.2 % (ref 11.5–14.5)
EST. AVERAGE GLUCOSE BLD GHB EST-MCNC: 94 MG/DL
GLUCOSE SERPL-MCNC: 90 MG/DL (ref 74–99)
HBA1C MFR BLD: 4.9 % (ref ?–5.7)
HCT VFR BLD AUTO: 48.5 % (ref 41–52)
HDLC SERPL-MCNC: 47.7 MG/DL
HGB BLD-MCNC: 15.6 G/DL (ref 13.5–17.5)
MCH RBC QN AUTO: 28.3 PG (ref 26–34)
MCHC RBC AUTO-ENTMCNC: 32.2 G/DL (ref 32–36)
MCV RBC AUTO: 88 FL (ref 80–100)
NON-HDL CHOLESTEROL: 191 MG/DL (ref 0–149)
NRBC BLD-RTO: 0 /100 WBCS (ref 0–0)
PHOSPHATE SERPL-MCNC: 3 MG/DL (ref 2.5–4.9)
PLATELET # BLD AUTO: 253 X10*3/UL (ref 150–450)
POTASSIUM SERPL-SCNC: 4.4 MMOL/L (ref 3.5–5.3)
PROT UR-ACNC: <4 MG/DL (ref 5–25)
PROT/CREAT UR: ABNORMAL MG/G{CREAT}
RBC # BLD AUTO: 5.51 X10*6/UL (ref 4.5–5.9)
SODIUM SERPL-SCNC: 137 MMOL/L (ref 136–145)
URATE SERPL-MCNC: 6.7 MG/DL (ref 4–7.5)
WBC # BLD AUTO: 5.1 X10*3/UL (ref 4.4–11.3)

## 2025-09-05 ENCOUNTER — TELEPHONE (OUTPATIENT)
Facility: HOSPITAL | Age: 40
End: 2025-09-05
Payer: COMMERCIAL

## 2025-09-05 DIAGNOSIS — E78.00 HIGH CHOLESTEROL: ICD-10-CM

## 2025-09-05 LAB — CYCLOSPORINE BLD IA-MCNC: 109 NG/ML (ref 80–210)

## 2025-09-05 RX ORDER — ROSUVASTATIN CALCIUM 5 MG/1
5 TABLET, COATED ORAL NIGHTLY
Qty: 30 TABLET | Refills: 11 | Status: SHIPPED | OUTPATIENT
Start: 2025-09-05 | End: 2026-09-05

## 2025-10-10 ENCOUNTER — APPOINTMENT (OUTPATIENT)
Facility: HOSPITAL | Age: 40
End: 2025-10-10
Payer: COMMERCIAL